# Patient Record
Sex: FEMALE | Race: WHITE | Employment: FULL TIME | URBAN - METROPOLITAN AREA
[De-identification: names, ages, dates, MRNs, and addresses within clinical notes are randomized per-mention and may not be internally consistent; named-entity substitution may affect disease eponyms.]

---

## 2020-07-04 ENCOUNTER — HOSPITAL ENCOUNTER (EMERGENCY)
Facility: HOSPITAL | Age: 30
Discharge: HOME | End: 2020-07-04
Attending: EMERGENCY MEDICINE

## 2020-07-04 VITALS
HEART RATE: 90 BPM | OXYGEN SATURATION: 98 % | WEIGHT: 170 LBS | DIASTOLIC BLOOD PRESSURE: 91 MMHG | RESPIRATION RATE: 17 BRPM | BODY MASS INDEX: 25.76 KG/M2 | SYSTOLIC BLOOD PRESSURE: 141 MMHG | TEMPERATURE: 98 F | HEIGHT: 68 IN

## 2020-07-04 DIAGNOSIS — S81.011A LACERATION OF RIGHT KNEE, INITIAL ENCOUNTER: Primary | ICD-10-CM

## 2020-07-04 PROCEDURE — 12002 RPR S/N/AX/GEN/TRNK2.6-7.5CM: CPT

## 2020-07-04 PROCEDURE — 99283 EMERGENCY DEPT VISIT LOW MDM: CPT

## 2020-07-04 PROCEDURE — 90471 IMMUNIZATION ADMIN: CPT

## 2020-07-04 NOTE — ED INITIAL ASSESSMENT (HPI)
Pt presetnts to ED with laceration on right knee. Pt states she snuck into her apartments pool and when she was getting out , she slipped striking her knee. Pt states she did not know how bad it was until she got home.

## 2020-07-04 NOTE — ED PROVIDER NOTES
Patient Seen in: [facility] Emergency Department      History   Patient presents with:  Laceration Abrasion    Stated Complaint:     HPI    25-year-old female coming for evaluation for right knee laceration.   Patient states that she tripped while ge Pulmonary:      Effort: Pulmonary effort is normal. No respiratory distress. Abdominal:      General: There is no distension. Palpations: Abdomen is soft. Tenderness: There is no tenderness. Musculoskeletal: Normal range of motion.

## 2023-07-21 ENCOUNTER — OFFICE VISIT (OUTPATIENT)
Facility: LOCATION | Age: 33
End: 2023-07-21
Payer: COMMERCIAL

## 2023-07-21 VITALS — HEART RATE: 81 BPM | TEMPERATURE: 98 F

## 2023-07-21 DIAGNOSIS — K64.4 ANAL SKIN TAG: Primary | ICD-10-CM

## 2023-07-21 PROCEDURE — 46600 DIAGNOSTIC ANOSCOPY SPX: CPT | Performed by: SURGERY

## 2023-07-21 PROCEDURE — 99204 OFFICE O/P NEW MOD 45 MIN: CPT | Performed by: SURGERY

## 2024-12-18 ENCOUNTER — INITIAL PRENATAL (OUTPATIENT)
Facility: CLINIC | Age: 34
End: 2024-12-18
Payer: COMMERCIAL

## 2024-12-18 ENCOUNTER — ULTRASOUND ENCOUNTER (OUTPATIENT)
Facility: CLINIC | Age: 34
End: 2024-12-18
Payer: COMMERCIAL

## 2024-12-18 VITALS
HEIGHT: 68 IN | SYSTOLIC BLOOD PRESSURE: 100 MMHG | WEIGHT: 182 LBS | HEART RATE: 54 BPM | DIASTOLIC BLOOD PRESSURE: 82 MMHG | BODY MASS INDEX: 27.58 KG/M2

## 2024-12-18 DIAGNOSIS — O36.80X0 PREGNANCY WITH INCONCLUSIVE FETAL VIABILITY, SINGLE OR UNSPECIFIED FETUS (HCC): ICD-10-CM

## 2024-12-18 DIAGNOSIS — Z12.4 CERVICAL CANCER SCREENING: ICD-10-CM

## 2024-12-18 DIAGNOSIS — Z3A.08 8 WEEKS GESTATION OF PREGNANCY (HCC): ICD-10-CM

## 2024-12-18 DIAGNOSIS — Z34.01 ENCOUNTER FOR SUPERVISION OF NORMAL FIRST PREGNANCY IN FIRST TRIMESTER (HCC): Primary | ICD-10-CM

## 2024-12-18 PROBLEM — O09.529 ANTEPARTUM MULTIGRAVIDA OF ADVANCED MATERNAL AGE (HCC): Status: ACTIVE | Noted: 2024-12-18

## 2024-12-18 LAB
APPEARANCE: CLEAR
BILIRUBIN: NEGATIVE
GLUCOSE (URINE DIPSTICK): NEGATIVE MG/DL
KETONES (URINE DIPSTICK): NEGATIVE MG/DL
LEUKOCYTES: NEGATIVE
MULTISTIX LOT#: NORMAL NUMERIC
NITRITE, URINE: NEGATIVE
OCCULT BLOOD: NEGATIVE
PH, URINE: 7 (ref 4.5–8)
PROTEIN (URINE DIPSTICK): NEGATIVE MG/DL
SPECIFIC GRAVITY: 1.02 (ref 1–1.03)
URINE-COLOR: YELLOW
UROBILINOGEN,SEMI-QN: 0.2 MG/DL (ref 0–1.9)

## 2024-12-18 PROCEDURE — 87491 CHLMYD TRACH DNA AMP PROBE: CPT | Performed by: OBSTETRICS & GYNECOLOGY

## 2024-12-18 PROCEDURE — 87591 N.GONORRHOEAE DNA AMP PROB: CPT | Performed by: OBSTETRICS & GYNECOLOGY

## 2024-12-18 PROCEDURE — 87086 URINE CULTURE/COLONY COUNT: CPT | Performed by: OBSTETRICS & GYNECOLOGY

## 2024-12-18 PROCEDURE — 81002 URINALYSIS NONAUTO W/O SCOPE: CPT | Performed by: OBSTETRICS & GYNECOLOGY

## 2024-12-18 PROCEDURE — 3074F SYST BP LT 130 MM HG: CPT | Performed by: OBSTETRICS & GYNECOLOGY

## 2024-12-18 PROCEDURE — 76801 OB US < 14 WKS SINGLE FETUS: CPT | Performed by: OBSTETRICS & GYNECOLOGY

## 2024-12-18 PROCEDURE — 87624 HPV HI-RISK TYP POOLED RSLT: CPT | Performed by: OBSTETRICS & GYNECOLOGY

## 2024-12-18 PROCEDURE — 3008F BODY MASS INDEX DOCD: CPT | Performed by: OBSTETRICS & GYNECOLOGY

## 2024-12-18 PROCEDURE — 3079F DIAST BP 80-89 MM HG: CPT | Performed by: OBSTETRICS & GYNECOLOGY

## 2024-12-19 LAB
C TRACH DNA SPEC QL NAA+PROBE: NEGATIVE
HPV E6+E7 MRNA CVX QL NAA+PROBE: NEGATIVE
N GONORRHOEA DNA SPEC QL NAA+PROBE: NEGATIVE

## 2024-12-29 LAB
.: NORMAL
.: NORMAL

## 2025-01-05 NOTE — PROGRESS NOTES
New OB    Patient denies h/o HSV, blood transfusion, hepatitis. Taking PNV    EDC by LMP c/w 9w1d US    Pap smear today    NIPS and carrier screen discussed - will consider next visit     AMA  - 32 weeks growth and NST 36 weeks

## 2025-01-15 ENCOUNTER — ROUTINE PRENATAL (OUTPATIENT)
Facility: CLINIC | Age: 35
End: 2025-01-15
Payer: COMMERCIAL

## 2025-01-15 ENCOUNTER — TELEPHONE (OUTPATIENT)
Facility: CLINIC | Age: 35
End: 2025-01-15

## 2025-01-15 ENCOUNTER — LAB ENCOUNTER (OUTPATIENT)
Dept: LAB | Age: 35
End: 2025-01-15
Attending: OBSTETRICS & GYNECOLOGY
Payer: COMMERCIAL

## 2025-01-15 VITALS
SYSTOLIC BLOOD PRESSURE: 100 MMHG | HEART RATE: 60 BPM | WEIGHT: 187.63 LBS | HEIGHT: 68 IN | DIASTOLIC BLOOD PRESSURE: 52 MMHG | BODY MASS INDEX: 28.44 KG/M2

## 2025-01-15 DIAGNOSIS — Z3A.12 12 WEEKS GESTATION OF PREGNANCY (HCC): ICD-10-CM

## 2025-01-15 DIAGNOSIS — O09.521 AMA (ADVANCED MATERNAL AGE) MULTIGRAVIDA 35+, FIRST TRIMESTER (HCC): Primary | ICD-10-CM

## 2025-01-15 DIAGNOSIS — Z34.01 ENCOUNTER FOR SUPERVISION OF NORMAL FIRST PREGNANCY IN FIRST TRIMESTER (HCC): ICD-10-CM

## 2025-01-15 DIAGNOSIS — Z13.79 GENETIC SCREENING: ICD-10-CM

## 2025-01-15 LAB
ANTIBODY SCREEN: NEGATIVE
BASOPHILS # BLD AUTO: 0.04 X10(3) UL (ref 0–0.2)
BASOPHILS NFR BLD AUTO: 0.6 %
DEPRECATED HBV CORE AB SER IA-ACNC: 18 NG/ML
EOSINOPHIL # BLD AUTO: 0.16 X10(3) UL (ref 0–0.7)
EOSINOPHIL NFR BLD AUTO: 2.2 %
ERYTHROCYTE [DISTWIDTH] IN BLOOD BY AUTOMATED COUNT: 12.8 %
HBV SURFACE AG SER-ACNC: <0.1 [IU]/L
HBV SURFACE AG SERPL QL IA: NONREACTIVE
HCT VFR BLD AUTO: 37.9 %
HCV AB SERPL QL IA: NONREACTIVE
HGB BLD-MCNC: 12.8 G/DL
IMM GRANULOCYTES # BLD AUTO: 0.04 X10(3) UL (ref 0–1)
IMM GRANULOCYTES NFR BLD: 0.6 %
LYMPHOCYTES # BLD AUTO: 1.6 X10(3) UL (ref 1–4)
LYMPHOCYTES NFR BLD AUTO: 22.3 %
MCH RBC QN AUTO: 29.7 PG (ref 26–34)
MCHC RBC AUTO-ENTMCNC: 33.8 G/DL (ref 31–37)
MCV RBC AUTO: 87.9 FL
MONOCYTES # BLD AUTO: 0.44 X10(3) UL (ref 0.1–1)
MONOCYTES NFR BLD AUTO: 6.1 %
NEUTROPHILS # BLD AUTO: 4.89 X10 (3) UL (ref 1.5–7.7)
NEUTROPHILS # BLD AUTO: 4.89 X10(3) UL (ref 1.5–7.7)
NEUTROPHILS NFR BLD AUTO: 68.2 %
PLATELET # BLD AUTO: 314 10(3)UL (ref 150–450)
RBC # BLD AUTO: 4.31 X10(6)UL
RH BLOOD TYPE: NEGATIVE
RUBV IGG SER QL: POSITIVE
RUBV IGG SER-ACNC: 50 IU/ML (ref 10–?)
T PALLIDUM AB SER QL IA: NONREACTIVE
WBC # BLD AUTO: 7.2 X10(3) UL (ref 4–11)

## 2025-01-15 PROCEDURE — 3078F DIAST BP <80 MM HG: CPT

## 2025-01-15 PROCEDURE — 86803 HEPATITIS C AB TEST: CPT | Performed by: OBSTETRICS & GYNECOLOGY

## 2025-01-15 PROCEDURE — 82728 ASSAY OF FERRITIN: CPT | Performed by: OBSTETRICS & GYNECOLOGY

## 2025-01-15 PROCEDURE — 86901 BLOOD TYPING SEROLOGIC RH(D): CPT | Performed by: OBSTETRICS & GYNECOLOGY

## 2025-01-15 PROCEDURE — 87389 HIV-1 AG W/HIV-1&-2 AB AG IA: CPT | Performed by: OBSTETRICS & GYNECOLOGY

## 2025-01-15 PROCEDURE — 86780 TREPONEMA PALLIDUM: CPT | Performed by: OBSTETRICS & GYNECOLOGY

## 2025-01-15 PROCEDURE — 86850 RBC ANTIBODY SCREEN: CPT | Performed by: OBSTETRICS & GYNECOLOGY

## 2025-01-15 PROCEDURE — 87340 HEPATITIS B SURFACE AG IA: CPT | Performed by: OBSTETRICS & GYNECOLOGY

## 2025-01-15 PROCEDURE — 3008F BODY MASS INDEX DOCD: CPT

## 2025-01-15 PROCEDURE — 86900 BLOOD TYPING SEROLOGIC ABO: CPT | Performed by: OBSTETRICS & GYNECOLOGY

## 2025-01-15 PROCEDURE — 85025 COMPLETE CBC W/AUTO DIFF WBC: CPT | Performed by: OBSTETRICS & GYNECOLOGY

## 2025-01-15 PROCEDURE — 3074F SYST BP LT 130 MM HG: CPT

## 2025-01-15 PROCEDURE — 86762 RUBELLA ANTIBODY: CPT | Performed by: OBSTETRICS & GYNECOLOGY

## 2025-01-15 NOTE — PROGRESS NOTES
Samm 12w1d    Having trouble staying asleep at night, will wake up at 3 am -Magnesium, unisom, and melatonin discussed  Reminded her to complete PNL - will get it done today.      EDC by LMP c/w 9w1d US     NIPS and carrier screen drawn today      AMA  - 32 weeks growth and NST 36 weeks

## 2025-01-15 NOTE — TELEPHONE ENCOUNTER
Patients name &  verified with patient.   Attempted x 3. Taken to lab to be drawn with   Lab tubes labeled   NIPT and carrier screen drawn by lab.  Tubed labeled. Test given to PSR for processing and send out.   Jacquelyn information given and patient instructed to call in 10-30 days to discuss results. Patient verbalized understanding.

## 2025-02-11 PROBLEM — E66.3 OVERWEIGHT (BMI 25.0-29.9): Status: ACTIVE | Noted: 2025-02-11

## 2025-02-11 PROBLEM — O09.512 AMA (ADVANCED MATERNAL AGE) PRIMIGRAVIDA 35+, SECOND TRIMESTER (HCC): Status: ACTIVE | Noted: 2025-02-11

## 2025-02-11 PROBLEM — G47.00 INSOMNIA: Status: ACTIVE | Noted: 2025-02-11

## 2025-02-11 PROBLEM — O26.892: Status: ACTIVE | Noted: 2025-02-11

## 2025-02-11 PROBLEM — Z67.91: Status: ACTIVE | Noted: 2025-02-11

## 2025-02-11 PROBLEM — E61.1 IRON DEFICIENCY: Status: ACTIVE | Noted: 2025-01-15

## 2025-02-12 ENCOUNTER — ROUTINE PRENATAL (OUTPATIENT)
Facility: CLINIC | Age: 35
End: 2025-02-12
Payer: COMMERCIAL

## 2025-02-12 VITALS
SYSTOLIC BLOOD PRESSURE: 114 MMHG | HEART RATE: 61 BPM | WEIGHT: 193 LBS | DIASTOLIC BLOOD PRESSURE: 54 MMHG | BODY MASS INDEX: 29.25 KG/M2 | HEIGHT: 68 IN

## 2025-02-12 DIAGNOSIS — E61.1 IRON DEFICIENCY: ICD-10-CM

## 2025-02-12 DIAGNOSIS — Z13.1 SCREENING FOR DIABETES MELLITUS: ICD-10-CM

## 2025-02-12 DIAGNOSIS — Z36.89 ENCOUNTER FOR FETAL ANATOMIC SURVEY (HCC): ICD-10-CM

## 2025-02-12 DIAGNOSIS — E66.3 OVERWEIGHT (BMI 25.0-29.9): ICD-10-CM

## 2025-02-12 DIAGNOSIS — Z67.91: ICD-10-CM

## 2025-02-12 DIAGNOSIS — O26.892: ICD-10-CM

## 2025-02-12 DIAGNOSIS — Z91.89 AT HIGH RISK FOR PRE-ECLAMPSIA: ICD-10-CM

## 2025-02-12 DIAGNOSIS — Z13.29 SCREENING FOR THYROID DISORDER: ICD-10-CM

## 2025-02-12 DIAGNOSIS — O09.512 AMA (ADVANCED MATERNAL AGE) PRIMIGRAVIDA 35+, SECOND TRIMESTER (HCC): Primary | ICD-10-CM

## 2025-02-12 DIAGNOSIS — G47.00 INSOMNIA, UNSPECIFIED TYPE: ICD-10-CM

## 2025-02-12 DIAGNOSIS — Z36.1 ANTENATAL SCREENING FOR RAISED ALPHAFETOPROTEIN LEVEL (HCC): ICD-10-CM

## 2025-02-12 LAB
CREAT UR-SCNC: 67.2 MG/DL
PROT UR-MCNC: 9.7 MG/DL (ref ?–14)

## 2025-02-12 PROCEDURE — 3074F SYST BP LT 130 MM HG: CPT | Performed by: OBSTETRICS & GYNECOLOGY

## 2025-02-12 PROCEDURE — 82570 ASSAY OF URINE CREATININE: CPT | Performed by: OBSTETRICS & GYNECOLOGY

## 2025-02-12 PROCEDURE — 84156 ASSAY OF PROTEIN URINE: CPT | Performed by: OBSTETRICS & GYNECOLOGY

## 2025-02-12 PROCEDURE — 3008F BODY MASS INDEX DOCD: CPT | Performed by: OBSTETRICS & GYNECOLOGY

## 2025-02-12 PROCEDURE — 3078F DIAST BP <80 MM HG: CPT | Performed by: OBSTETRICS & GYNECOLOGY

## 2025-02-12 NOTE — PATIENT INSTRUCTIONS
Call [facility] to schedule 20 wk fetal anatomy ultrasound (\"Level 2\")    Aspirin advised   -81 mg tablet - take 1, 1.5, or 2 tablets per day.   -may help prevent  pre-eclampsia by helping with placental development and function  -may discontinue at term (37 wk)     Limit weight gain to 15-25 lb for entire pregnancy       Rh negative blood type  Pregnant people with \"Rh negative\" blood types need to receive Rhogam injection at 28 wk prophylactically & earlier during pregnancy if trauma (e.g. fall, car accident, blow to abdomen) or unexplained vaginal bleeding occurs. If there is transfer of fetal cells across the placenta to the mother and the fetus has \"Rh positive\" blood type, this can lead to the maternal immune system making antibodies to attack the fetus, which could lead to severe fetal anemia and fetal death in potentially all future pregnancies.     AFP Level   There is an optional blood test that we can perform on you called \"AFP level.\" It is a chemical in the bloodstream produced by the pregnancy. It is done between 15-20 weeks gestation. The ideal time for testing is actually 16-18 weeks gestation. If the level is abnormally elevated, this can indicate the presence of abnormalities of the development of the brain and spinal cord such as anencephaly (lack of brain development) and spina bifida (exposed spinal cord). These anomalies can generally be seen on ultrasound. It can also be elevated in cases of normal fetal anatomy and can indicate an abnormal placenta. This may or may not be able to be detected on ultrasound. Please think about whether or not you would like to have this test done. When you decide when you would like to have this test done, please let us know. We must enter your exact gestational age and weight on the date of the blood draw for the test to be calculated accurately.     ICD-10 code  screening for raised alphafetoprotein level (HCC) [Z36.1] - diagnosis code  (CPT)  code for maternal serum alpha-fetoprotein (MSAFP) is 67882       Maternal Fetal Medicine (MFM)  Charan Barnhart Taylor, [place]   [address], [address]   Ph [phone]    [place]   Antoine JOSEPHINE [address]  [phone]    [place]h Gill  [address], [address]  Ph [phone]    [place]  [address] , [address]  Ph [phone]         [facility] Prenatal Classes (and virtual tour of Labor & Delivery unit)  [email or web address]  [phone]    Pre-registration for the hospital  [email or web address]    Breast pump  -contact your insurance to request them to send an order to us for their preferred medical supply company  Or  -contact [employer] (flyer available on the UserApp wall across from reception desk)   [email or web address]    Car seat *MUST* be installed before infant(s) can be leave the hospital    Doctor for baby   *Please select a pediatrician or family medicine provider BEFORE you are admitted to the hospital to give birth.   Pediatrics (birth-18 years of age) or Family Medicine (birth through adulthood)  Make sure that provider accepts your insurance.   Pick a provider that is close to where you live.  If the provider is on staff at [facility], the nursing staff will notify them when your infant is born so they are aware to come to the hospital to examine the infant.   If the provider you have chosen is not on staff at [facility], a pediatric hospitalist will care for your infant during the hospitalization only. You must arrange the follow up visit with your provider.   After delivery at the hospital follow up visit instructions for baby will be provided prior to discharge.     The baby will not be able to leave the hospital without a follow up visit scheduled.     To establish care:  [email or web address]  Or   [facility] Physician  Referral line at [phone]    There are MANY providers available. It would be impossible to list them all, but here are a few popular pediatrics groups in [place]:    [facility] [place] [phone]  [facility] [place] [phone]  [facility] [place] [phone]  [facility] [place] [phone]  [facility] [place] [phone]  [facility] [phone]    There are also many wonderful independent practitioners as well. We recommend you speak with family, friends, colleagues as personal recommendations can be very helpful.

## 2025-02-12 NOTE — PROGRESS NOTES
ANTONINA 16w1d - visit #3    Chief Complaint   Patient presents with    Prenatal Care     ANTONINA 16w 1d  - No complaints    Chaperone declines     No vaginal bleeding. No cramping    Vitals:    25 1047   BP: 114/54   Pulse: 61   Weight: 193 lb (87.5 kg)   Height: 68\"      TWG 13 lb (5.897 kg)     34 year old  at 16w1d   ADELITA 23 by LMP c/w 9w1d US  A negative      -NIPS low risk, BOY   -carrier screen neg  -msAFP discussed - will do it.   -classes, pre-registration, pediatrician, breast pump, car seat discussed      AMA  -discussed increased risk for GDM & GHTN/pre-eclampsia   -baseline A1c, TSH, CMP, uric acid, urine P/C  -aspirin discussed (AMA & primigravida) - advised to start now   -L2 US with MFM advised & ordered   -growth with BPP at 32 wk   -NST weekly at 36 wk     Rh negative  -Rhogam at 28 wk discussed     Overweight (pre preg BMI 27)  -wt gain goal 15-25 lb discussed      Iron deficiency  -1/15 Hb 12.8, ferritin 18  -advised extra PO iron - not taking yet     Insomnia   -12 wk - trouble staying asleep at night, will wake up at 3 am   -Magnesium, unisom, and melatonin discussed  -improved     RTC 4 wk

## 2025-02-20 ENCOUNTER — TELEPHONE (OUTPATIENT)
Facility: CLINIC | Age: 35
End: 2025-02-20

## 2025-03-05 ENCOUNTER — LAB ENCOUNTER (OUTPATIENT)
Dept: LAB | Age: 35
End: 2025-03-05
Attending: OBSTETRICS & GYNECOLOGY
Payer: COMMERCIAL

## 2025-03-05 DIAGNOSIS — Z13.1 SCREENING FOR DIABETES MELLITUS: ICD-10-CM

## 2025-03-05 DIAGNOSIS — Z13.29 SCREENING FOR THYROID DISORDER: ICD-10-CM

## 2025-03-05 DIAGNOSIS — O09.512 AMA (ADVANCED MATERNAL AGE) PRIMIGRAVIDA 35+, SECOND TRIMESTER (HCC): ICD-10-CM

## 2025-03-05 DIAGNOSIS — Z91.89 AT HIGH RISK FOR PRE-ECLAMPSIA: ICD-10-CM

## 2025-03-05 DIAGNOSIS — Z36.1 ANTENATAL SCREENING FOR RAISED ALPHAFETOPROTEIN LEVEL (HCC): ICD-10-CM

## 2025-03-05 PROBLEM — R79.89 ELEVATED TSH: Status: ACTIVE | Noted: 2025-03-05

## 2025-03-05 LAB
ALBUMIN SERPL-MCNC: 4.1 G/DL (ref 3.2–4.8)
ALBUMIN/GLOB SERPL: 1.5 {RATIO} (ref 1–2)
ALP LIVER SERPL-CCNC: 44 U/L
ALT SERPL-CCNC: 11 U/L
ANION GAP SERPL CALC-SCNC: 10 MMOL/L (ref 0–18)
AST SERPL-CCNC: 15 U/L (ref ?–34)
BILIRUB SERPL-MCNC: 0.5 MG/DL (ref 0.3–1.2)
BUN BLD-MCNC: 11 MG/DL (ref 9–23)
CALCIUM BLD-MCNC: 9 MG/DL (ref 8.7–10.6)
CHLORIDE SERPL-SCNC: 101 MMOL/L (ref 98–112)
CO2 SERPL-SCNC: 27 MMOL/L (ref 21–32)
CREAT BLD-MCNC: 0.66 MG/DL
EGFRCR SERPLBLD CKD-EPI 2021: 117 ML/MIN/1.73M2 (ref 60–?)
EST. AVERAGE GLUCOSE BLD GHB EST-MCNC: 103 MG/DL (ref 68–126)
FASTING STATUS PATIENT QL REPORTED: YES
GLOBULIN PLAS-MCNC: 2.7 G/DL (ref 2–3.5)
GLUCOSE BLD-MCNC: 89 MG/DL (ref 70–99)
HBA1C MFR BLD: 5.2 % (ref ?–5.7)
OSMOLALITY SERPL CALC.SUM OF ELEC: 285 MOSM/KG (ref 275–295)
POTASSIUM SERPL-SCNC: 4.4 MMOL/L (ref 3.5–5.1)
PROT SERPL-MCNC: 6.8 G/DL (ref 5.7–8.2)
SODIUM SERPL-SCNC: 138 MMOL/L (ref 136–145)
TSI SER-ACNC: 3.9 UIU/ML (ref 0.55–4.78)
URATE SERPL-MCNC: 3 MG/DL

## 2025-03-05 PROCEDURE — 83036 HEMOGLOBIN GLYCOSYLATED A1C: CPT | Performed by: OBSTETRICS & GYNECOLOGY

## 2025-03-05 PROCEDURE — 82105 ALPHA-FETOPROTEIN SERUM: CPT | Performed by: OBSTETRICS & GYNECOLOGY

## 2025-03-05 PROCEDURE — 84550 ASSAY OF BLOOD/URIC ACID: CPT | Performed by: OBSTETRICS & GYNECOLOGY

## 2025-03-05 PROCEDURE — 80053 COMPREHEN METABOLIC PANEL: CPT | Performed by: OBSTETRICS & GYNECOLOGY

## 2025-03-05 PROCEDURE — 84443 ASSAY THYROID STIM HORMONE: CPT | Performed by: OBSTETRICS & GYNECOLOGY

## 2025-03-06 DIAGNOSIS — R79.89 ELEVATED TSH: Primary | ICD-10-CM

## 2025-03-06 DIAGNOSIS — Z13.29 SCREENING FOR THYROID DISORDER: Primary | ICD-10-CM

## 2025-03-06 NOTE — PROGRESS NOTES
Pt aware of results & recommendations:   Baseline CMP, A1c, Uric acid normal at 19w1d    TSH 3.895 mildly elevated at 19w1d    -while still technically \"in normal range\" for the lab, the ideal TSH is <3.0 in 2nd & 3rd trimesters  -controversial whether or not to treat \"subclinical hypothyroidism\"  -some societies are pro treatment & some are not.  -the TSH can fluctuate with illness, stress, etc  -have a few choices - can recheck it in 2-4 wk & then decide if wants to take medication, or could start some low dose levothyroxine now and recheck TSH in 4 weeks.   Preference?     Pt will repeat TSH in 2-4 weeks. Order pended.    Verbalized understanding.

## 2025-03-07 LAB
AFP MOM: 0.94
AFP VALUE: 40.3 NG/ML
GA ON COLL DATE: 19.1 WEEKS
INSULIN DEP AFP: NO
MAT AGE AT EDD: 37.2 YR
MULTIPLE GEST AFP: NO
OSBR RISK 1 IN AFP: NORMAL
WEIGHT AFP: 193 LBS

## 2025-03-12 ENCOUNTER — ROUTINE PRENATAL (OUTPATIENT)
Facility: CLINIC | Age: 35
End: 2025-03-12
Payer: COMMERCIAL

## 2025-03-12 VITALS
DIASTOLIC BLOOD PRESSURE: 66 MMHG | HEART RATE: 76 BPM | BODY MASS INDEX: 30 KG/M2 | WEIGHT: 196.19 LBS | SYSTOLIC BLOOD PRESSURE: 114 MMHG

## 2025-03-12 DIAGNOSIS — Z3A.20 20 WEEKS GESTATION OF PREGNANCY (HCC): ICD-10-CM

## 2025-03-12 DIAGNOSIS — O09.522 AMA (ADVANCED MATERNAL AGE) MULTIGRAVIDA 35+, SECOND TRIMESTER (HCC): Primary | ICD-10-CM

## 2025-03-12 PROCEDURE — 3074F SYST BP LT 130 MM HG: CPT

## 2025-03-12 PROCEDURE — 3078F DIAST BP <80 MM HG: CPT

## 2025-03-12 NOTE — PROGRESS NOTES
Samm 20w1d       ADELITA 7/29/25 by LMP c/w 9w1d US  A negative      -NIPS low risk, BOY   -carrier screen neg  -msAFP discussed - will do it.   -classes, pre-registration, pediatrician, breast pump, car seat discussed      AMA  -discussed increased risk for GDM & GHTN/pre-eclampsia   -baseline A1c, TSH, CMP, uric acid, urine P/C  -aspirin discussed (AMA & primigravida) - has not started yet, advise to start today   -L2 US with MFM advised & ordered - gave info to schedule an appointment   -growth with BPP at 32 wk   -NST weekly at 36 wk      Rh negative  -Rhogam at 28 wk discussed      Overweight (pre preg BMI 27)  -wt gain goal 15-25 lb discussed       Iron deficiency  -1/15 Hb 12.8, ferritin 18  -advised extra PO iron - not taking yet      Insomnia   -12 wk - trouble staying asleep at night, will wake up at 3 am   -Magnesium, unisom, and melatonin discussed  -improved   20 wk- sleep is improving, she will wake up and fall back asleep      
<-- Click to add NO significant Past Surgical History

## 2025-03-17 ENCOUNTER — OFFICE VISIT (OUTPATIENT)
Dept: PERINATAL CARE | Facility: HOSPITAL | Age: 35
End: 2025-03-17
Attending: OBSTETRICS & GYNECOLOGY
Payer: COMMERCIAL

## 2025-03-17 VITALS
DIASTOLIC BLOOD PRESSURE: 71 MMHG | BODY MASS INDEX: 30.01 KG/M2 | SYSTOLIC BLOOD PRESSURE: 112 MMHG | HEIGHT: 68 IN | WEIGHT: 198 LBS | HEART RATE: 56 BPM

## 2025-03-17 DIAGNOSIS — O09.512 AMA (ADVANCED MATERNAL AGE) PRIMIGRAVIDA 35+, SECOND TRIMESTER (HCC): ICD-10-CM

## 2025-03-17 DIAGNOSIS — O09.512 AMA (ADVANCED MATERNAL AGE) PRIMIGRAVIDA 35+, SECOND TRIMESTER (HCC): Primary | ICD-10-CM

## 2025-03-17 PROCEDURE — 76811 OB US DETAILED SNGL FETUS: CPT | Performed by: OBSTETRICS & GYNECOLOGY

## 2025-03-17 RX ORDER — ASPIRIN 81 MG/1
81 TABLET ORAL DAILY
COMMUNITY

## 2025-03-17 NOTE — PROGRESS NOTES
Reason for Consult:   Dear Dr. Moon ,    Thank you for requesting ultrasound evaluation and maternal fetal medicine consultation on Sara Priest.  As you are aware she is a 34 year old female with a Oscar pregnancy .  A maternal-fetal medicine consultation was requested secondary to  AMA.  Her prenatal records and labs were reviewed.    Review of History:     OB History:    OB History    Para Term  AB Living   1 0 0 0 0 0   SAB IAB Ectopic Multiple Live Births   0 0 0 0 0      # Outcome Date GA Lbr Shmuel/2nd Weight Sex Type Anes PTL Lv   1 Current                      Allergies:  Allergies[1]   Current Meds:  Current Outpatient Medications   Medication Sig Dispense Refill    aspirin 81 MG Oral Tab EC Take 1 tablet (81 mg total) by mouth daily.      Prenatal MV-Min-Fe Fum-FA-DHA (PRENATAL 1 OR) Take by mouth.          HISTORY:  Past Medical History:    Acne    ASCUS with positive high risk HPV cervical    4/26/10 ASCUS, +HPV - [facility] per [facility]. Repeat pap in 1 year normal.    Bleeding internal hemorrhoids    saw GI    Hirsutism    mild on abdomen    History of tobacco use    1/2 ppd x 7 years from around [age] years old. Cold turkey.    Insomnia    During pregnancy - early morning awakening at 3 am noted at 12 wk gestation    Iron deficiency    Noted on initial prenatal labs      Overweight (BMI 25.0-29.9)    Screening for genetic disease carrier status    NEGATIVE FOR 14 OUT OF 14 DISEASES      No past surgical history on file.   Family History   Problem Relation Age of Onset    Prostate Cancer Father 50        caught early    Breast Cancer Maternal Grandmother 72    Diabetes Neg     Colon Cancer Neg     Ovarian Cancer Neg     Uterine Cancer Neg     Birth Defects Neg     Genetic Disease Neg       Social History     Socioeconomic History    Marital status: Significant Other   Tobacco Use    Smoking status: Former     Types: Cigarettes    Smokeless tobacco: Never    Vaping Use    Vaping status: Every Day    Start date: 1/12/2019   Substance and Sexual Activity    Alcohol use: Not Currently     Alcohol/week: 3.0 standard drinks of alcohol     Types: 3 Glasses of wine per week    Drug use: Not Currently     Frequency: 3.0 times per week     Types: Cannabis   Other Topics Concern    Caffeine Concern No    Exercise Yes    Seat Belt Yes    Special Diet No    Stress Concern No    Weight Concern No     Social Drivers of Health     Food Insecurity: No Food Insecurity (12/18/2024)    Food Insecurity     Food Insecurity: Never true   Transportation Needs: No Transportation Needs (12/18/2024)    Transportation Needs     Lack of Transportation: No   Stress: No Stress Concern Present (12/18/2024)    Stress     Feeling of Stress : No   Housing Stability: Low Risk  (12/18/2024)    Housing Stability     Housing Instability: No        NARRATIVE:     /71 (BP Location: Right arm, Patient Position: Sitting, Cuff Size: adult)   Pulse 56   Ht 5' 8\" (1.727 m)   Wt 198 lb (89.8 kg)   LMP 10/22/2024 (Exact Date)   BMI 30.11 kg/m²           Alert and Oriented.  No acute distress          Abdomen:  soft, nontender, no contractions noted.           extremities:  nontender, no edema    DISCUSSION  During her visit we discussed and reviewed the following issues:  ADVANCED MATERNAL AGE    Background  I reviewed with the patient that pregnancies in women of advanced maternal age (35 or older at delivery) are associated with elevated risks. Specifically, there is a higher rate of:  Fetal malformations  Preeclampsia  Gestational diabetes  Intrauterine fetal death    As a result, enhanced pregnancy surveillance is advised for these patients including a comprehensive ultrasound to assess for fetal malformations (at 20 weeks) and a third trimester ultrasound assessment for fetal growth (at 32 weeks). In addition, weekly NST's (initiating at 36 weeks gestation for women 35-39 years and at 32 weeks  gestation for women 40 years and older) are also advised. Routine obstetric care is more than adequate to assess for gestational diabetes and preeclampsia; hence, no further significant alterations in obstetric care are advised.    Medical Complications    Women 35 years of age or older can expect to experience two to three fold higher rates of hospitalization,  delivery, and pregnancy-related complications when compared to their younger counterparts.  The two most common medical problems complicating these  pregnanccies are hypertension and diabetes.   The incidence of preeclampsia in the general obstetric population is 3 to 4 percent; this increases to 5 to 10 percent in women over age 40 and is as high as 35 percent in women over age 50.   The incidence of gestational diabetes in the general obstetric population is 3 percent, rising to 7 to 12 percent in women over age 40 and 20 percent in women over age 50.  Women 35 years of age or older are more likely to be delivered by . The  delivery rate in the general obstetric population of the [place] is almost 30 percent, compared to almost 50 percent in women over age 40 to 45 and almost 80 percent in women age 50 to 63.          Fetal Death        A decision analysis tool using data from the [place] [employer]  Database predicted a strategy of weekly antepartum testing and labor induction would lower the risk of unexplained fetal death in women 35 years of age or older. In this model, weekly testing starting at 36 weeks of gestation would drop the risk of fetal death from 5.2 to 1.3 per 1000 pregnancies. While a policy of antepartum testing in older women does increase the chance that a women will be induced (71 inductions per fetal death averted) and thereby increases her risk of having a  delivery, only 14 additional cesareans would need to be performed to avert one unexplained fetal death.  Hence, weekly NST's are  advised for women of advanced maternal age; testing should be initiated at 36 weeks for women 35-39 years and at 32 weeks for women 40 years and older.    Fetal Malformations    Cardiac malformations, clubfoot, and diaphragmatic hernia appear to occur with increased frequency in offspring of older women. These abnormalities are structural and unrelated to aneuploidy, thus they would not be detected by karyotype analysis.  For these reasons a complete, detailed ultrasound (level II) is advised even if the fetus has a normal karyotype.      Fetal Aneuploidy      Invasive Testing  I offered invasive genetic testing (amniocentesis, chorionic villus sampling) after reviewing the diagnostic accuracy of these tests as well as the procedure associated loss rate (1:500 for genetic amniocentesis).        We discussed  the increased risk of chromosomal abnormalities associated with advanced maternal age at age 35. She understands that ultrasound exam cannot exclude potential genetic abnormalities.  Her estimated risk based on maternal age at amniocentesis with any chromosome abnormality is about 1:80  and with Down Syndrome is about 1: 150.   We also discussed the risks and benefits of having  genetic testing ([facility] and amniocentesis) performed.      PANORAMA TEST LOW RISK              OB ULTRASOUND REPORT   See imaging tab for complete ultrasound report or in PACS    Single IUP in breech presentation.    Placenta is anterior.   A 3 vessel cord is noted.  Cardiac activity is present at 136 bpm   g ( 0 lb 15 oz);   MVP is 4.3 cm .         Fetal Anatomy:  Visualized with normal appearance: head, face, spine, neck, skin, chest, abdominal wall, gastrointestinal tract, kidneys, bladder, extremities.   Brain: Visualized and normal appearances: brain parenchyma, cerebral ventricles, choroid plexus, Cisterna Magna, midline falx, cerebellum, cerebellar lobes, posterior fossa, vermis, cavum septi pellucidi.  Face: eyes normal,  profile normal, nose normal, lip normal, palate normal.  Heart: visualized and normal appearance: 3 vessel view, four-chamber, left outflow tract, right outflow tract, arches.      Genetic Sonogram:  Nuchal fold normal.  Pyelectasis absent.  No hyperechogenic bowel.  Echogenic intracardiac foci absent. Nasal bone present. Choroid plexus cyst absent.      Summary of Ultrasound findings:  This is a Oscar pregnancy    The fetal measurements are consistent with established EDC. No gross ultrasound evidence of structural abnormalities are seen today. No minor markers for aneuploidy are seen. The patient understands that ultrasound cannot rule out all structural and chromosomal abnormalities.       IMPRESSION:   1. IUP @  20w6d  2. Scan consistent with dates  3. No fetal structural abnormalities seen  4. AMA    RECOMMENDATIONS:   1.  Weekly NST at 36wks  2.  Growth US at 32wks    Thank you for allowing me to participate in the care of your patient.  Please do not hesitate to call with any questions or concerns.     Total time spent   40  minutes this calendar day which includes preparing to see the patient including chart review, obtaining and/or reviewing additional medical history, performing a physical exam and evaluation, documenting clinical information in the electronic medical record, independently interpreting results, counseling the patient, communicating results to the patient/family/caregiver and coordinating care.    Abel Centeno D.O.  Maternal Fetal Medicine     Note to patient and family:  The 21st Century Cures Act makes medical notes available to patients in the interest of transparency.  However, please be advised that this is a medical document.  It is intended as a peer to peer communication.  It is written in medical language and may contain abbreviations or verbiage that are technical and unfamiliar.  It may appear blunt or direct.  Medical documents are intended to carry relevant information,  facts as evident, and the clinical opinion of the practitioner.         [1] No Known Allergies

## 2025-03-17 NOTE — PROGRESS NOTES
Pt here for level II ultrasound/doctor consult  + flutters  Pt c/o irreg lower abd tightness, denies uterine cramping, vag bleeding and leaking fluid.

## 2025-04-09 ENCOUNTER — ROUTINE PRENATAL (OUTPATIENT)
Facility: CLINIC | Age: 35
End: 2025-04-09
Payer: COMMERCIAL

## 2025-04-09 VITALS
WEIGHT: 204 LBS | SYSTOLIC BLOOD PRESSURE: 100 MMHG | HEIGHT: 68 IN | BODY MASS INDEX: 30.92 KG/M2 | HEART RATE: 62 BPM | DIASTOLIC BLOOD PRESSURE: 62 MMHG

## 2025-04-09 DIAGNOSIS — Z3A.24 24 WEEKS GESTATION OF PREGNANCY (HCC): ICD-10-CM

## 2025-04-09 DIAGNOSIS — Z34.82 PRENATAL CARE, SUBSEQUENT PREGNANCY IN SECOND TRIMESTER (HCC): Primary | ICD-10-CM

## 2025-04-09 PROCEDURE — 3008F BODY MASS INDEX DOCD: CPT | Performed by: OBSTETRICS & GYNECOLOGY

## 2025-04-09 PROCEDURE — 3074F SYST BP LT 130 MM HG: CPT | Performed by: OBSTETRICS & GYNECOLOGY

## 2025-04-09 PROCEDURE — 3078F DIAST BP <80 MM HG: CPT | Performed by: OBSTETRICS & GYNECOLOGY

## 2025-04-09 NOTE — PROGRESS NOTES
24w1d    Patient has no complaints  - 1GTT and CBC ordered      ADELITA 7/29/25 by LMP c/w 9w1d US       -NIPS low risk, BOY   -carrier screen neg  -msAFP discussed - neg  -classes, pre-registration, pediatrician, breast pump, car seat discussed      AMA  -discussed increased risk for GDM & GHTN/pre-eclampsia   -baseline A1c, TSH, CMP, uric acid, urine P/C  -aspirin discussed (AMA & primigravida)   -L2 US NL  -growth with BPP at 32 wk   -NST weekly at 36 wk      Rh negative  -Rhogam at 28 wk discussed      Overweight (pre preg BMI 27)  -wt gain goal 15-25 lb discussed       Iron deficiency  -1/15 Hb 12.8, ferritin 18  -advised extra PO iron

## 2025-04-23 ENCOUNTER — LAB ENCOUNTER (OUTPATIENT)
Dept: LAB | Age: 35
End: 2025-04-23
Attending: OBSTETRICS & GYNECOLOGY
Payer: COMMERCIAL

## 2025-04-23 DIAGNOSIS — R79.89 ELEVATED TSH: ICD-10-CM

## 2025-04-23 DIAGNOSIS — Z34.82 PRENATAL CARE, SUBSEQUENT PREGNANCY IN SECOND TRIMESTER (HCC): ICD-10-CM

## 2025-04-23 LAB
BASOPHILS # BLD AUTO: 0.04 X10(3) UL (ref 0–0.2)
BASOPHILS NFR BLD AUTO: 0.4 %
EOSINOPHIL # BLD AUTO: 0.14 X10(3) UL (ref 0–0.7)
EOSINOPHIL NFR BLD AUTO: 1.5 %
ERYTHROCYTE [DISTWIDTH] IN BLOOD BY AUTOMATED COUNT: 12.5 %
GLUCOSE 1H P GLC SERPL-MCNC: 115 MG/DL (ref 70–130)
HCT VFR BLD AUTO: 34.6 % (ref 35–48)
HGB BLD-MCNC: 11.8 G/DL (ref 12–16)
IMM GRANULOCYTES # BLD AUTO: 0.05 X10(3) UL (ref 0–1)
IMM GRANULOCYTES NFR BLD: 0.5 %
LYMPHOCYTES # BLD AUTO: 1.72 X10(3) UL (ref 1–4)
LYMPHOCYTES NFR BLD AUTO: 18.3 %
MCH RBC QN AUTO: 30.1 PG (ref 26–34)
MCHC RBC AUTO-ENTMCNC: 34.1 G/DL (ref 31–37)
MCV RBC AUTO: 88.3 FL (ref 80–100)
MONOCYTES # BLD AUTO: 0.66 X10(3) UL (ref 0.1–1)
MONOCYTES NFR BLD AUTO: 7 %
NEUTROPHILS # BLD AUTO: 6.79 X10 (3) UL (ref 1.5–7.7)
NEUTROPHILS # BLD AUTO: 6.79 X10(3) UL (ref 1.5–7.7)
NEUTROPHILS NFR BLD AUTO: 72.3 %
PLATELET # BLD AUTO: 269 10(3)UL (ref 150–450)
RBC # BLD AUTO: 3.92 X10(6)UL (ref 3.8–5.3)
TSI SER-ACNC: 3.25 UIU/ML (ref 0.55–4.78)
WBC # BLD AUTO: 9.4 X10(3) UL (ref 4–11)

## 2025-04-23 PROCEDURE — 84443 ASSAY THYROID STIM HORMONE: CPT | Performed by: OBSTETRICS & GYNECOLOGY

## 2025-04-23 PROCEDURE — 85025 COMPLETE CBC W/AUTO DIFF WBC: CPT | Performed by: OBSTETRICS & GYNECOLOGY

## 2025-04-23 PROCEDURE — 82950 GLUCOSE TEST: CPT | Performed by: OBSTETRICS & GYNECOLOGY

## 2025-04-25 NOTE — PROGRESS NOTES
Pt aware of results & recommendations: TSH 3.25 at 26w1d.   -This was a repeat check due to while still technically \"in normal range\" for the lab, the ideal TSH is <3.0 in 2nd & 3rd trimesters   -controversial whether or not to treat \"subclinical hypothyroidism\"   -some societies are pro treatment & some are not.   -the TSH can fluctuate with illness, stress, etc   -could opt to start low dose levothyroxine if desires    Will hold off on medication at this time and discuss at next ANTONINA appointment. Verbalized understanding.

## 2025-05-07 ENCOUNTER — ROUTINE PRENATAL (OUTPATIENT)
Facility: CLINIC | Age: 35
End: 2025-05-07
Payer: COMMERCIAL

## 2025-05-07 ENCOUNTER — LAB ENCOUNTER (OUTPATIENT)
Dept: LAB | Age: 35
End: 2025-05-07
Payer: COMMERCIAL

## 2025-05-07 VITALS
HEART RATE: 80 BPM | BODY MASS INDEX: 31.83 KG/M2 | SYSTOLIC BLOOD PRESSURE: 110 MMHG | WEIGHT: 210 LBS | DIASTOLIC BLOOD PRESSURE: 64 MMHG | HEIGHT: 68 IN

## 2025-05-07 DIAGNOSIS — Z67.91 RH NEGATIVE STATE IN ANTEPARTUM PERIOD (HCC): ICD-10-CM

## 2025-05-07 DIAGNOSIS — O26.899 RH NEGATIVE STATE IN ANTEPARTUM PERIOD (HCC): ICD-10-CM

## 2025-05-07 DIAGNOSIS — O09.523 AMA (ADVANCED MATERNAL AGE) MULTIGRAVIDA 35+, THIRD TRIMESTER (HCC): Primary | ICD-10-CM

## 2025-05-07 DIAGNOSIS — Z11.3 ROUTINE SCREENING FOR STI (SEXUALLY TRANSMITTED INFECTION): ICD-10-CM

## 2025-05-07 LAB — T PALLIDUM AB SER QL IA: NONREACTIVE

## 2025-05-07 PROCEDURE — 3078F DIAST BP <80 MM HG: CPT

## 2025-05-07 PROCEDURE — 3074F SYST BP LT 130 MM HG: CPT

## 2025-05-07 PROCEDURE — 3008F BODY MASS INDEX DOCD: CPT

## 2025-05-07 PROCEDURE — 87389 HIV-1 AG W/HIV-1&-2 AB AG IA: CPT

## 2025-05-07 PROCEDURE — 96372 THER/PROPH/DIAG INJ SC/IM: CPT

## 2025-05-07 PROCEDURE — 86780 TREPONEMA PALLIDUM: CPT

## 2025-05-07 NOTE — PROGRESS NOTES
Samm 28w1d    She is having some mamie goldstein - reassured pt    ADELITA 7/29/25 by LMP c/w 9w1d US        -NIPS low risk, BOY   -carrier screen neg  -msAFP discussed - neg  -classes, pre-registration, pediatrician, breast pump, car seat discussed   -1 hr gtt & CBC done   -tdap discussed - considering, ask at next visit   -peds info given      AMA  -discussed increased risk for GDM & GHTN/pre-eclampsia   -baseline A1c, TSH, CMP, uric acid, urine P/C  -aspirin discussed (AMA & primigravida)   -L2 US NL  -growth with BPP at 32 wk - reminded, order placed   -NST weekly at 36 wk      Rh negative  -Rhogam given today 5/7     Overweight (pre preg BMI 27)  -wt gain goal 15-25 lb discussed       Iron deficiency  -1/15 Hb 12.8, ferritin 18  -advised extra PO iron   -4/23 Hb 11.8

## 2025-05-10 ENCOUNTER — TELEPHONE (OUTPATIENT)
Facility: CLINIC | Age: 35
End: 2025-05-10

## 2025-05-10 NOTE — TELEPHONE ENCOUNTER
Breast Pump order was received from [employer].  Order form was placed in Dr. Merary Dang's bin for signature.

## 2025-05-21 ENCOUNTER — ROUTINE PRENATAL (OUTPATIENT)
Facility: CLINIC | Age: 35
End: 2025-05-21
Payer: COMMERCIAL

## 2025-05-21 VITALS
HEART RATE: 70 BPM | BODY MASS INDEX: 31.67 KG/M2 | DIASTOLIC BLOOD PRESSURE: 62 MMHG | HEIGHT: 68 IN | WEIGHT: 209 LBS | SYSTOLIC BLOOD PRESSURE: 106 MMHG

## 2025-05-21 DIAGNOSIS — Z23 NEED FOR TDAP VACCINATION: ICD-10-CM

## 2025-05-21 DIAGNOSIS — O26.03 EXCESSIVE WEIGHT GAIN DURING PREGNANCY IN THIRD TRIMESTER (HCC): ICD-10-CM

## 2025-05-21 DIAGNOSIS — Z67.91: ICD-10-CM

## 2025-05-21 DIAGNOSIS — G47.00 INSOMNIA, UNSPECIFIED TYPE: ICD-10-CM

## 2025-05-21 DIAGNOSIS — O26.893: ICD-10-CM

## 2025-05-21 DIAGNOSIS — R79.89 ELEVATED TSH: ICD-10-CM

## 2025-05-21 DIAGNOSIS — E61.1 IRON DEFICIENCY: ICD-10-CM

## 2025-05-21 DIAGNOSIS — E66.3 OVERWEIGHT (BMI 25.0-29.9): ICD-10-CM

## 2025-05-21 DIAGNOSIS — Z13.1 SCREENING FOR DIABETES MELLITUS: ICD-10-CM

## 2025-05-21 DIAGNOSIS — O09.513 AMA (ADVANCED MATERNAL AGE) PRIMIGRAVIDA 35+, THIRD TRIMESTER (HCC): Primary | ICD-10-CM

## 2025-05-21 PROCEDURE — 3078F DIAST BP <80 MM HG: CPT | Performed by: OBSTETRICS & GYNECOLOGY

## 2025-05-21 PROCEDURE — 3074F SYST BP LT 130 MM HG: CPT | Performed by: OBSTETRICS & GYNECOLOGY

## 2025-05-21 PROCEDURE — 90471 IMMUNIZATION ADMIN: CPT | Performed by: OBSTETRICS & GYNECOLOGY

## 2025-05-21 PROCEDURE — 90715 TDAP VACCINE 7 YRS/> IM: CPT | Performed by: OBSTETRICS & GYNECOLOGY

## 2025-05-21 PROCEDURE — 3008F BODY MASS INDEX DOCD: CPT | Performed by: OBSTETRICS & GYNECOLOGY

## 2025-05-21 NOTE — PATIENT INSTRUCTIONS
Labs please.     No more weight gain.     EXCESSIVE WEIGHT GAIN  Gaining too much weight increases the risk of a large fetus. A larger fetus places itself and the mother at risk for injury during birth and increases the risk that it will not be able to descend through the pelvis, making a  section necessary. Larger fetuses can struggle with low blood sugars (hypoglycemia) as a  which is not good for the brain.     Controlling weight gain in pregnancy:  Look at current diet - write down everything you eat for a few days. Count up your mg protein, grams of fiber, etc.   Protein - aim for at least 100 grams per day. [email or web address]. Add plain (no herbal additives, etc) protein powder if needed. Whey, hemp, pea protein, etc all fine  Water - Aim for 1 gallon per day  Fiber rich foods. 25-28 grams per day.   Heartburn medication if needed   Avoid fast/simple carbohydrates (sodas) - this can spike your insulin levels & can trigger hypoglycemia which can cause ravenous hunger  Adequate sleep important for hormonal regulation of appetite.   Avoid high sodium foods. Aim for potassium-rich foods. These will have a slight diuretic effect.   Walk for at least 15 minutes after every meal.     Basic sample meal plan:    Breakfast: 15-30 g carbs for breakfast  Mid morning snack (if hungry): 15 -30 g carb   Lunch: 45-60 g carb  Mid afternoon snack (if hungry): 15-30 g carb  Dinner: 45-60 g carb   Bedtime snack if hungry can be 15 g carb with some protein.  Snack should be between 8-10 pm     Hypertension related to pregnancy/postpartum    -Watch for symptoms of pre-eclampsia (severe or persistent headache not relieved with a dose of tylenol, visual disturbances, persistent or severe upper abdominal pain, shortness of breath, chest pain)  -Please obtain a blood pressure cuff for home from the list of [place] Blood Pressure Validated Device Listing- see www.validatebp.org  -Check blood pressure (and heart rate if you can)  2-3 times per day & more frequently if feeling poorly. Keep log & bring to visits.      If BP is 140/90 or higher (either number) you will likely be prescribed oral antihypertensive medication. Check blood pressure before a dose of medication. Can hold the medicine if you feel your blood pressure is getting too low (less than 110/70) or you are lightheaded.      If BP is 160/110 (either number) or higher, or you develop symptoms of pre-eclampsia, please immediately go to the emergency department at [facility] & let them know you may have pre-eclampsia. You will likely require IV antihypertensives and IV magnesium sulfate to prevent stroke and seizures.      [facility] Prenatal Classes (and virtual tour of Labor & Delivery unit)  [email or web address]  [phone]    Pre-registration for the hospital  [email or web address]    Breast pump  -contact your insurance to request them to send an order to us for their preferred medical supply company  Or  -contact [employer] (flyer available on the lobby wall across from reception desk)   [email or web address]    Car seat *MUST* be installed before infant(s) can be leave the hospital    Doctor for baby   *Please select a pediatrician or family medicine provider BEFORE you are admitted to the hospital to give birth.   Pediatrics (birth-18 years of age) or Family Medicine (birth through adulthood)  Make sure that provider accepts your insurance.   Pick a provider that is close to where you live.  If the provider is on staff at [facility], the nursing staff will notify them when your infant is born so they are aware to come to the hospital to examine the infant.   If the provider you have chosen is not on staff at [facility], a pediatric hospitalist will care for your infant during the hospitalization only. You must arrange the follow up visit with your provider.   After delivery at the hospital follow up visit  instructions for baby will be provided prior to discharge.     The baby will not be able to leave the hospital without a follow up visit scheduled.     To establish care:  [email or web address]  Or   chris [facility] Physician Referral line at [phone]    There are MANY providers available. It would be impossible to list them all, but here are a few popular pediatrics groups in [place]:    [facility] [place] [phone]  [facility] [place] [phone]  [facility] [place] [phone]  [facility] [place] [phone]  [facility] [place] [phone]  [facility] [phone]    There are also many wonderful independent practitioners as well. We recommend you speak with family, friends, colleagues as personal recommendations can be very helpful.

## 2025-05-21 NOTE — PROGRESS NOTES
ANTONINA 30w1d     Chief Complaint   Patient presents with    Prenatal Care     ANTONINA 30w 1d  - Tdap and pregnancy vaccine info given to pt     +FM. Menstrual like cramp intermittently. No contractions, leaking fluid, vaginal bleeding. No headache, vision changes, epigastric pain. Diet is not great. Hungry often. More snacking. Tries to eat a lot of protein. There has been a little fruit.   Mild swelling in legs & feet.     Vitals:    25 1232   BP: 106/62   Pulse: 70   Weight: 209 lb (94.8 kg)   Height: 68\"      TWG 29 lb (13.2 kg)     35 year old  at 30w1d   ADELITA 25 by LMP c/w 9w1d US  A negative      -NIPS low risk, BOY   -carrier screen neg  -msAFP low risk   -classes, pre-registration, pediatrician, breast pump, car seat discussed at previous  -1 hr gtt & CBC done   -3rd trim HIV & Tpal negative   -tdap 25    AMA  -discussed increased risk for GDM & GHTN/pre-eclampsia   -baseline A1c, TSH, CMP, uric acid, urine P/C - done at 19w1d - normal   -aspirin discussed (AMA & primigravida) -  taking 81 mg daily  -L2 US normal   -growth with BPP at 32 wk - reminded, order placed - appt   -NST weekly at 36 wk      Rh negative  -Rhogam      Overweight (pre preg BMI 27) -> Excessive weight gain  -wt gain goal 15-25 lb discussed. TWG 29 lb (13.2 kg) as of 30w1d   -Cautioned regarding increased risk of fetal macrosomia, birth injury for fetus and mother, need for  section   -check A1c   -discussed BP cuff for home   -increase protein, fiber, healthy fats       Iron deficiency  -1/15 Hb 12.8, ferritin 18  -advised extra PO iron   - Hb 11.8  -repeat CBC     TSH 3.895 mildly elevated at 19w1d    -while still technically \"in normal range\" for the lab, the ideal TSH is <3.0 in 2nd & 3rd trimesters   -controversial whether or not to treat \"subclinical hypothyroidism\"   -repeat TSH in 2-4 wk - TSH 3.25 at 26w1d.   -This was a repeat check due to while still technically \"in normal range\" for the lab, the  ideal TSH is <3.0 in 2nd & 3rd trimesters   -controversial whether or not to treat \"subclinical hypothyroidism\"   -some societies are pro treatment & some are not.   -the TSH can fluctuate with illness, stress, etc   -could opt to start low dose levothyroxine if desires. Did not start medication    RTC 2 wk. NST weekly at 36 wk - message sent to  staff

## 2025-05-24 ENCOUNTER — TELEPHONE (OUTPATIENT)
Facility: CLINIC | Age: 35
End: 2025-05-24

## 2025-06-04 ENCOUNTER — ROUTINE PRENATAL (OUTPATIENT)
Facility: CLINIC | Age: 35
End: 2025-06-04
Payer: COMMERCIAL

## 2025-06-04 VITALS
HEIGHT: 68 IN | DIASTOLIC BLOOD PRESSURE: 60 MMHG | WEIGHT: 209.63 LBS | HEART RATE: 71 BPM | BODY MASS INDEX: 31.77 KG/M2 | SYSTOLIC BLOOD PRESSURE: 110 MMHG

## 2025-06-04 DIAGNOSIS — Z3A.32 32 WEEKS GESTATION OF PREGNANCY (HCC): Primary | ICD-10-CM

## 2025-06-04 PROCEDURE — 3078F DIAST BP <80 MM HG: CPT

## 2025-06-04 PROCEDURE — 3074F SYST BP LT 130 MM HG: CPT

## 2025-06-04 PROCEDURE — 3008F BODY MASS INDEX DOCD: CPT

## 2025-06-04 NOTE — PROGRESS NOTES
Samm 32w1d    She is doing well, no complaints    ADELITA 25 by LMP c/w 9w1d US  A negative      -NIPS low risk, BOY   -carrier screen neg  -msAFP low risk   -classes, pre-registration, pediatrician, breast pump, car seat discussed at previous  -1 hr gtt & CBC done   -3rd trim HIV & Tpal negative   -tdap 25     AMA  -discussed increased risk for GDM & GHTN/pre-eclampsia   -baseline A1c, TSH, CMP, uric acid, urine P/C - done at 19w1d - normal   -aspirin discussed (AMA & primigravida) -  taking 81 mg daily  -L2 US normal   -growth with BPP at 32 wk - reminded, order placed - appt   -NST weekly at 36 wk      Rh negative  -Rhogam      Overweight (pre preg BMI 27) -> Excessive weight gain  -wt gain goal 15-25 lb discussed. TWG 29 lb (13.2 kg) as of 30w1d   -Cautioned regarding increased risk of fetal macrosomia, birth injury for fetus and mother, need for  section   -check A1c   -discussed BP cuff for home   -increase protein, fiber, healthy fats       Iron deficiency  -1/15 Hb 12.8, ferritin 18  -advised extra PO iron   - Hb 11.8  -repeat CBC      TSH 3.895 mildly elevated at 19w1d    -while still technically \"in normal range\" for the lab, the ideal TSH is <3.0 in 2nd & 3rd trimesters   -controversial whether or not to treat \"subclinical hypothyroidism\"   -repeat TSH in 2-4 wk - TSH 3.25 at 26w1d.   -This was a repeat check due to while still technically \"in normal range\" for the lab, the ideal TSH is <3.0 in 2nd & 3rd trimesters   -controversial whether or not to treat \"subclinical hypothyroidism\"   -some societies are pro treatment & some are not.   -the TSH can fluctuate with illness, stress, etc   -could opt to start low dose levothyroxine if desires. Did not start medication

## 2025-06-04 NOTE — PROGRESS NOTES
Outpatient Maternal-Fetal Medicine Consultation    Dear Dr. Dang    Thank you for requesting an ultrasound and maternal-fetal medicine consultation on your patient Sara Priest.  As you are aware she is a 35 year old female  with a mandujano pregnancy and an Estimated Date of Delivery: 25.  She returned to maternal-fetal medicine today for a follow-up visit.  Her history was reviewed from her prior visit and there were no interval changes.    Antepartum Risk Factors  AMA: low-risk cell free fetal DNA, declined invasive testing    PHYSICAL EXAMINATION:  /77 (BP Location: Right arm, Patient Position: Sitting, Cuff Size: adult)   Pulse 62   Ht 5' 8\" (1.727 m)   Wt 213 lb (96.6 kg)   LMP 10/22/2024 (Exact Date)   BMI 32.39 kg/m²   General: alert and oriented, no acute distress  Abdomen: gravid, soft, non-tender  Extremities: non-tender, no edema    OBSTETRIC ULTRASOUND  The patient had a follow-up growth ultrasound today which revealed normal interval fetal growth, BPP 8/8.    Ultrasound Findings:  Single IUP in cephalic presentation.    Placenta is anterior.   A 3 vessel cord is noted. Normal cord insertion.  Cardiac activity is present at 133 bpm  EFW 2373 g ( 5 lb 4 oz); 60%.    LINDA is  29 cm.  MVP is 8.7 cm  BPP is 8/8.     The fetal measurements are consistent with established ADELITA. No gross ultrasound evidence of structural abnormalities are seen today. The patient understands that ultrasound cannot rule out all structural and chromosomal abnormalities.     See Imaging Tab For Complete Ultrasound Report  I interpreted the results and reviewed them with the patient.    DISCUSSION  During her visit we discussed and reviewed the following issues:  ADVANCED MATERNAL AGE  See prior [facility] notes for a detailed review.  She did not desire invasive genetic testing.   She has already obtained a low-risk NPIT result and was appropriately reassured.     POLYHYDRAMNIOS  Discussion  Polyhydramnios was  demonstrated on today's ultrasound.  I discussed the possible etiologies of polyhydramnios including obstruction, neurologic, karyotypic, nonkaryotypic and diabetes. Hydramnios may increase the risk of PTL/PTD and abruptio placenta if rupture membrane rupture occurs. In most  Cases (~70%) it is idiopathic and approximately 2/3 of hydramnios will resolve spontaneously.  Amniotic fluid was seen within the fetal stomach but this does not exclude the possibility of esophageal / tracheal atresia or webs.    Timing of delivery  In mild to moderate polyhydramnios with normal NST and BPP,induction of labor advised at 39 to 40 weeks of gestation as the risk of fetal death appears to increase significantly at term. In severe cases, consider induction of labor at 37 weeks to minimize the risk of umbilical cord prolapse and/or abruption upon rupture of membranes. In severe polyhydramnios unresponsive to therapy with intolerant maternal symptoms between 34 and 37 weeks,consider delivery of well-dated pregnancies without requiring amniocentesis for lung maturity.     The Society of Maternal-Fetal Medicine has opined that labor should be allowed to occur spontaneously at term for women with mild idiopathic polyhydramnios. The American College of Obstetricians and Gynecologists recommends delivery at 39+0 to 39+6 weeks, unless additional pregnancy complications warrant earlier delivery. Delivery at a tertiary center is recommended for women with severe polyhydramnios since fetal anomalies may be present.    IMPRESSION:  IUP at 33w2d  AMA: low-risk cell free fetal DNA, declined invasive testing  Polyhydramnios: Likely idiopathic    RECOMMENDATIONS:  Continue care with Dr. Dang  Follow-up growth and BPP ultrasound in 4 weeks  Weekly NST's      Thank you for allowing me to participate in the care of your patient.  Please do not hesitate to contact me if additional questions or concerns arise.      Jones Farrar M.D.    30  minutes spent in review of records, patient consultation, documentation and coordination of care.  The relevant clinical matter(s) are summarized above.     Note to patient and family  The 21st Century Cures Act makes medical notes available to patients in the interest of transparency.  However, please be advised that this is a medical document.  It is intended as [id number] communication.  It is written and medical language may contain abbreviations or verbiage that are technical and unfamiliar.  It may appear blunt or direct.  Medical documents are intended to carry relevant information, facts as evident, and the clinical opinion of the practitioner.

## 2025-06-12 ENCOUNTER — OFFICE VISIT (OUTPATIENT)
Dept: PERINATAL CARE | Facility: HOSPITAL | Age: 35
End: 2025-06-12
Attending: OBSTETRICS & GYNECOLOGY
Payer: COMMERCIAL

## 2025-06-12 VITALS
HEIGHT: 68 IN | WEIGHT: 213 LBS | HEART RATE: 62 BPM | SYSTOLIC BLOOD PRESSURE: 123 MMHG | DIASTOLIC BLOOD PRESSURE: 77 MMHG | BODY MASS INDEX: 32.28 KG/M2

## 2025-06-12 DIAGNOSIS — O09.523 AMA (ADVANCED MATERNAL AGE) MULTIGRAVIDA 35+, THIRD TRIMESTER (HCC): ICD-10-CM

## 2025-06-12 DIAGNOSIS — O09.512 AMA (ADVANCED MATERNAL AGE) PRIMIGRAVIDA 35+, SECOND TRIMESTER (HCC): ICD-10-CM

## 2025-06-12 DIAGNOSIS — O40.3XX0 POLYHYDRAMNIOS AFFECTING PREGNANCY IN THIRD TRIMESTER (HCC): Primary | ICD-10-CM

## 2025-06-12 PROCEDURE — 76816 OB US FOLLOW-UP PER FETUS: CPT | Performed by: OBSTETRICS & GYNECOLOGY

## 2025-06-12 PROCEDURE — 76819 FETAL BIOPHYS PROFIL W/O NST: CPT

## 2025-06-18 ENCOUNTER — ROUTINE PRENATAL (OUTPATIENT)
Facility: CLINIC | Age: 35
End: 2025-06-18
Payer: COMMERCIAL

## 2025-06-18 ENCOUNTER — LAB ENCOUNTER (OUTPATIENT)
Dept: LAB | Facility: HOSPITAL | Age: 35
End: 2025-06-18
Attending: OBSTETRICS & GYNECOLOGY
Payer: COMMERCIAL

## 2025-06-18 VITALS
SYSTOLIC BLOOD PRESSURE: 108 MMHG | HEIGHT: 68 IN | HEART RATE: 69 BPM | BODY MASS INDEX: 33.19 KG/M2 | DIASTOLIC BLOOD PRESSURE: 54 MMHG | WEIGHT: 219 LBS

## 2025-06-18 DIAGNOSIS — E61.1 IRON DEFICIENCY: ICD-10-CM

## 2025-06-18 DIAGNOSIS — O40.9XX0 POLYHYDRAMNIOS, ANTEPARTUM, SINGLE OR UNSPECIFIED FETUS (HCC): Primary | ICD-10-CM

## 2025-06-18 DIAGNOSIS — Z3A.34 34 WEEKS GESTATION OF PREGNANCY (HCC): ICD-10-CM

## 2025-06-18 DIAGNOSIS — O26.03 EXCESSIVE WEIGHT GAIN DURING PREGNANCY IN THIRD TRIMESTER (HCC): ICD-10-CM

## 2025-06-18 DIAGNOSIS — Z13.1 SCREENING FOR DIABETES MELLITUS: ICD-10-CM

## 2025-06-18 PROBLEM — R73.09 ELEVATED HEMOGLOBIN A1C: Status: ACTIVE | Noted: 2025-06-18

## 2025-06-18 PROBLEM — D50.9 MATERNAL IRON DEFICIENCY ANEMIA COMPLICATING PREGNANCY IN THIRD TRIMESTER (HCC): Status: ACTIVE | Noted: 2025-01-15

## 2025-06-18 PROBLEM — O99.013 MATERNAL IRON DEFICIENCY ANEMIA COMPLICATING PREGNANCY IN THIRD TRIMESTER (HCC): Status: ACTIVE | Noted: 2025-01-15

## 2025-06-18 LAB
BASOPHILS # BLD AUTO: 0.04 X10(3) UL (ref 0–0.2)
BASOPHILS NFR BLD AUTO: 0.4 %
EOSINOPHIL # BLD AUTO: 0.17 X10(3) UL (ref 0–0.7)
EOSINOPHIL NFR BLD AUTO: 1.6 %
ERYTHROCYTE [DISTWIDTH] IN BLOOD BY AUTOMATED COUNT: 12.6 %
EST. AVERAGE GLUCOSE BLD GHB EST-MCNC: 120 MG/DL (ref 68–126)
HBA1C MFR BLD: 5.8 % (ref ?–5.7)
HCT VFR BLD AUTO: 30.9 % (ref 35–48)
HGB BLD-MCNC: 10.6 G/DL (ref 12–16)
IMM GRANULOCYTES # BLD AUTO: 0.14 X10(3) UL (ref 0–1)
IMM GRANULOCYTES NFR BLD: 1.3 %
LYMPHOCYTES # BLD AUTO: 2.13 X10(3) UL (ref 1–4)
LYMPHOCYTES NFR BLD AUTO: 20.1 %
MCH RBC QN AUTO: 28.8 PG (ref 26–34)
MCHC RBC AUTO-ENTMCNC: 34.3 G/DL (ref 31–37)
MCV RBC AUTO: 84 FL (ref 80–100)
MONOCYTES # BLD AUTO: 1 X10(3) UL (ref 0.1–1)
MONOCYTES NFR BLD AUTO: 9.4 %
NEUTROPHILS # BLD AUTO: 7.11 X10 (3) UL (ref 1.5–7.7)
NEUTROPHILS # BLD AUTO: 7.11 X10(3) UL (ref 1.5–7.7)
NEUTROPHILS NFR BLD AUTO: 67.2 %
PLATELET # BLD AUTO: 247 10(3)UL (ref 150–450)
RBC # BLD AUTO: 3.68 X10(6)UL (ref 3.8–5.3)
WBC # BLD AUTO: 10.6 X10(3) UL (ref 4–11)

## 2025-06-18 PROCEDURE — 85025 COMPLETE CBC W/AUTO DIFF WBC: CPT

## 2025-06-18 PROCEDURE — 59025 FETAL NON-STRESS TEST: CPT

## 2025-06-18 PROCEDURE — 36415 COLL VENOUS BLD VENIPUNCTURE: CPT

## 2025-06-18 PROCEDURE — 3078F DIAST BP <80 MM HG: CPT

## 2025-06-18 PROCEDURE — 83036 HEMOGLOBIN GLYCOSYLATED A1C: CPT

## 2025-06-18 PROCEDURE — 3008F BODY MASS INDEX DOCD: CPT

## 2025-06-18 PROCEDURE — 3074F SYST BP LT 130 MM HG: CPT

## 2025-06-18 NOTE — PROGRESS NOTES
Samm 34w1d    She is doing well, no complaints     NST reactive     ADELITA 25 by LMP c/w 9w1d US  A negative      -NIPS low risk, BOY   -carrier screen neg  -msAFP low risk   -classes, pre-registration, pediatrician, breast pump, car seat discussed at previous  -1 hr gtt & CBC done   -3rd trim HIV & Tpal negative   -tdap 25     AMA  -discussed increased risk for GDM & GHTN/pre-eclampsia   -baseline A1c, TSH, CMP, uric acid, urine P/C - done at 19w1d - normal   -aspirin discussed (AMA & primigravida) -  taking 81 mg daily  -L2 US normal   -growth with BPP at 32 wk -efw 60%. BPP    -NST weekly at 36 wk     Polyhydramnios  -33w2d : LINDA 29  -follow up growth +BPP in 4 wk: 7/10  -weekly NSTs      Rh negative  -Rhogam      Overweight (pre preg BMI 27) -> Excessive weight gain  -wt gain goal 15-25 lb discussed. TWG 29 lb (13.2 kg) as of 30w1d   -Cautioned regarding increased risk of fetal macrosomia, birth injury for fetus and mother, need for  section   -check A1c   -discussed BP cuff for home   -increase protein, fiber, healthy fats       Iron deficiency  -1/15 Hb 12.8, ferritin 18  -advised extra PO iron   - Hb 11.8  -repeat CBC      TSH 3.895 mildly elevated at 19w1d    -while still technically \"in normal range\" for the lab, the ideal TSH is <3.0 in 2nd & 3rd trimesters   -controversial whether or not to treat \"subclinical hypothyroidism\"   -repeat TSH in 2-4 wk - TSH 3.25 at 26w1d.   -This was a repeat check due to while still technically \"in normal range\" for the lab, the ideal TSH is <3.0 in 2nd & 3rd trimesters   -controversial whether or not to treat \"subclinical hypothyroidism\"   -some societies are pro treatment & some are not.   -the TSH can fluctuate with illness, stress, etc   -could opt to start low dose levothyroxine if desires. Did not start medication         NST weekly

## 2025-06-20 ENCOUNTER — TELEPHONE (OUTPATIENT)
Facility: CLINIC | Age: 35
End: 2025-06-20

## 2025-06-20 NOTE — PROGRESS NOTES
Patient aware of Dr. Moon's recommendations:   A1c elevated and significantly higher than it was 3 months ago. Now 5.8% at 34w1d   -given polyhydramnios & hirsutism, suspect late onset gestational diabetes   -can do 1 hr GTT (this is a \"screen\") or 3 hr GTT (this is \"diagnostic\") otherwise can proceed to diabetes education.   -3 hr GTT would be best but we are running short on time & this is a fasting test that needs to be scheduled.   -call Diabetic education Ph [phone]   -cut back on sugars & simple carbs. Increase protein & fiber intake. Walk 10-15 min after every meal.   Hb 10.6 at 34w1d   -Hb down from 11.8 about 1 month ago despite PO iron   -polyhydramnios (excess amniotic fluid) is a risk factor for hemorrhage at delivery   -recommend proceed with IV iron ASAP.     Patient would like to do 1gtt first, will call pt with IV IRON approval. Routed to [place]. Patient verbalized understanding, agreed to and intend to comply with plan of care.

## 2025-06-25 ENCOUNTER — TELEPHONE (OUTPATIENT)
Age: 35
End: 2025-06-25

## 2025-06-25 ENCOUNTER — ROUTINE PRENATAL (OUTPATIENT)
Facility: CLINIC | Age: 35
End: 2025-06-25
Payer: COMMERCIAL

## 2025-06-25 VITALS
DIASTOLIC BLOOD PRESSURE: 60 MMHG | WEIGHT: 219.81 LBS | BODY MASS INDEX: 33.31 KG/M2 | HEIGHT: 68 IN | HEART RATE: 66 BPM | SYSTOLIC BLOOD PRESSURE: 110 MMHG

## 2025-06-25 DIAGNOSIS — Z3A.35 35 WEEKS GESTATION OF PREGNANCY (HCC): ICD-10-CM

## 2025-06-25 DIAGNOSIS — O40.9XX0 POLYHYDRAMNIOS, ANTEPARTUM, SINGLE OR UNSPECIFIED FETUS (HCC): Primary | ICD-10-CM

## 2025-06-25 PROCEDURE — 59025 FETAL NON-STRESS TEST: CPT

## 2025-06-25 PROCEDURE — 3008F BODY MASS INDEX DOCD: CPT

## 2025-06-25 PROCEDURE — 3074F SYST BP LT 130 MM HG: CPT

## 2025-06-25 PROCEDURE — 3078F DIAST BP <80 MM HG: CPT

## 2025-06-25 NOTE — PROGRESS NOTES
Samm 35w1d    She is doing well, no complaints     Nst : reactive     ADELITA 25 by LMP c/w 9w1d US  A negative      -NIPS low risk, BOY   -carrier screen neg  -msAFP low risk   -classes, pre-registration, pediatrician, breast pump, car seat discussed at previous  -1 hr gtt & CBC done   -3rd trim HIV & Tpal negative   -tdap 25     AMA  -discussed increased risk for GDM & GHTN/pre-eclampsia   -baseline A1c, TSH, CMP, uric acid, urine P/C - done at 19w1d - normal   -aspirin discussed (AMA & primigravida) -  taking 81 mg daily  -L2 US normal   -growth with BPP at 32 wk -efw 60%. BPP    -NST weekly at 36 wk      Polyhydramnios  -33w2d : LINDA 29  -follow up growth +BPP in 4 wk: 7/10  -weekly NSTs      Rh negative  -Rhogam      Overweight (pre preg BMI 27) -> Excessive weight gain  -wt gain goal 15-25 lb discussed. TWG 29 lb (13.2 kg) as of 30w1d   -Cautioned regarding increased risk of fetal macrosomia, birth injury for fetus and mother, need for  section   -check A1c   -discussed BP cuff for home   -increase protein, fiber, healthy fats       Iron deficiency  -1/15 Hb 12.8, ferritin 18  -advised extra PO iron   - Hb 11.8  -repeat CBC      TSH 3.895 mildly elevated at 19w1d    -while still technically \"in normal range\" for the lab, the ideal TSH is <3.0 in 2nd & 3rd trimesters   -controversial whether or not to treat \"subclinical hypothyroidism\"   -repeat TSH in 2-4 wk - TSH 3.25 at 26w1d.   -This was a repeat check due to while still technically \"in normal range\" for the lab, the ideal TSH is <3.0 in 2nd & 3rd trimesters   -controversial whether or not to treat \"subclinical hypothyroidism\"   -some societies are pro treatment & some are not.   -the TSH can fluctuate with illness, stress, etc   -could opt to start low dose levothyroxine if desires. Did not start medication          NST weekly

## 2025-07-02 ENCOUNTER — ROUTINE PRENATAL (OUTPATIENT)
Facility: CLINIC | Age: 35
End: 2025-07-02
Payer: COMMERCIAL

## 2025-07-02 VITALS
DIASTOLIC BLOOD PRESSURE: 66 MMHG | HEIGHT: 68 IN | WEIGHT: 217 LBS | BODY MASS INDEX: 32.89 KG/M2 | HEART RATE: 80 BPM | SYSTOLIC BLOOD PRESSURE: 114 MMHG

## 2025-07-02 DIAGNOSIS — O40.9XX0 POLYHYDRAMNIOS, ANTEPARTUM, SINGLE OR UNSPECIFIED FETUS (HCC): ICD-10-CM

## 2025-07-02 DIAGNOSIS — O09.519: Primary | ICD-10-CM

## 2025-07-02 DIAGNOSIS — Z3A.36 36 WEEKS GESTATION OF PREGNANCY (HCC): ICD-10-CM

## 2025-07-02 PROCEDURE — 3078F DIAST BP <80 MM HG: CPT | Performed by: STUDENT IN AN ORGANIZED HEALTH CARE EDUCATION/TRAINING PROGRAM

## 2025-07-02 PROCEDURE — 87081 CULTURE SCREEN ONLY: CPT | Performed by: STUDENT IN AN ORGANIZED HEALTH CARE EDUCATION/TRAINING PROGRAM

## 2025-07-02 PROCEDURE — 59025 FETAL NON-STRESS TEST: CPT | Performed by: STUDENT IN AN ORGANIZED HEALTH CARE EDUCATION/TRAINING PROGRAM

## 2025-07-02 PROCEDURE — 87150 DNA/RNA AMPLIFIED PROBE: CPT | Performed by: STUDENT IN AN ORGANIZED HEALTH CARE EDUCATION/TRAINING PROGRAM

## 2025-07-02 PROCEDURE — 3008F BODY MASS INDEX DOCD: CPT | Performed by: STUDENT IN AN ORGANIZED HEALTH CARE EDUCATION/TRAINING PROGRAM

## 2025-07-02 PROCEDURE — 3074F SYST BP LT 130 MM HG: CPT | Performed by: STUDENT IN AN ORGANIZED HEALTH CARE EDUCATION/TRAINING PROGRAM

## 2025-07-02 NOTE — PROGRESS NOTES
Samm 36w1d    She is doing well, no complaints. + FM  Nst : reactive   GBS done  SVE: cl/50/-4, feels vertex also by Leopold's   Is ok with likely planning induction prior to 41 wks - will wait for next US (7/10) then schedule     ADELITA 25 by LMP c/w 9w1d US  A negative      -NIPS low risk, BOY   -carrier screen neg  -msAFP low risk   -classes, pre-registration, pediatrician, breast pump, car seat discussed at previous  -1 hr gtt & CBC done   -3rd trim HIV & Tpal negative   -tdap 25  -GBS: done today      AMA  -discussed increased risk for GDM & GHTN/pre-eclampsia   -baseline A1c, TSH, CMP, uric acid, urine P/C - done at 19w1d - normal   -aspirin discussed (AMA & primigravida) -  taking 81 mg daily  -L2 US normal   -growth with BPP at 32 wk -efw 60%. BPP    -NST weekly at 36 wk      Polyhydramnios  -33w2d : LINDA 29  -follow up growth +BPP in 4 wk: 7/10  -weekly NSTs      Rh negative  -Rhogam      Overweight (pre preg BMI 27) -> Excessive weight gain  -wt gain goal 15-25 lb discussed. TWG 29 lb (13.2 kg) as of 30w1d   -Cautioned regarding increased risk of fetal macrosomia, birth injury for fetus and mother, need for  section   -check A1c: 5/8%  -discussed BP cuff for home   -increase protein, fiber, healthy fats       Iron deficiency  -1/15 Hb 12.8, ferritin 18  -advised extra PO iron   - Hb 11.8  - Hgb 10.6 - IV Iron was recommended     TSH 3.895 mildly elevated at 19w1d    -while still technically \"in normal range\" for the lab, the ideal TSH is <3.0 in 2nd & 3rd trimesters   -controversial whether or not to treat \"subclinical hypothyroidism\"   -repeat TSH in 2-4 wk - TSH 3.25 at 26w1d.   -This was a repeat check due to while still technically \"in normal range\" for the lab, the ideal TSH is <3.0 in 2nd & 3rd trimesters   -controversial whether or not to treat \"subclinical hypothyroidism\"   -some societies are pro treatment & some are not.   -the TSH can fluctuate with illness, stress,  etc   -could opt to start low dose levothyroxine if desires. Did not start medication

## 2025-07-04 LAB — GROUP B STREP BY PCR FOR PCR OVT: NEGATIVE

## 2025-07-09 ENCOUNTER — ROUTINE PRENATAL (OUTPATIENT)
Facility: CLINIC | Age: 35
End: 2025-07-09
Payer: COMMERCIAL

## 2025-07-09 VITALS
WEIGHT: 222.81 LBS | HEART RATE: 66 BPM | DIASTOLIC BLOOD PRESSURE: 76 MMHG | HEIGHT: 68 IN | SYSTOLIC BLOOD PRESSURE: 122 MMHG | BODY MASS INDEX: 33.77 KG/M2

## 2025-07-09 DIAGNOSIS — O09.519: Primary | ICD-10-CM

## 2025-07-09 DIAGNOSIS — O09.513 AMA (ADVANCED MATERNAL AGE) PRIMIGRAVIDA 35+, THIRD TRIMESTER (HCC): ICD-10-CM

## 2025-07-09 DIAGNOSIS — O40.9XX0 POLYHYDRAMNIOS, ANTEPARTUM, SINGLE OR UNSPECIFIED FETUS (HCC): ICD-10-CM

## 2025-07-09 DIAGNOSIS — Z3A.37 37 WEEKS GESTATION OF PREGNANCY (HCC): ICD-10-CM

## 2025-07-09 DIAGNOSIS — O26.03 EXCESSIVE WEIGHT GAIN DURING PREGNANCY IN THIRD TRIMESTER (HCC): ICD-10-CM

## 2025-07-09 DIAGNOSIS — E61.1 IRON DEFICIENCY: ICD-10-CM

## 2025-07-09 DIAGNOSIS — R79.89 ELEVATED TSH: ICD-10-CM

## 2025-07-09 DIAGNOSIS — Z67.91: ICD-10-CM

## 2025-07-09 DIAGNOSIS — O26.893: ICD-10-CM

## 2025-07-09 PROCEDURE — 3008F BODY MASS INDEX DOCD: CPT | Performed by: STUDENT IN AN ORGANIZED HEALTH CARE EDUCATION/TRAINING PROGRAM

## 2025-07-09 PROCEDURE — 3074F SYST BP LT 130 MM HG: CPT | Performed by: STUDENT IN AN ORGANIZED HEALTH CARE EDUCATION/TRAINING PROGRAM

## 2025-07-09 PROCEDURE — 3078F DIAST BP <80 MM HG: CPT | Performed by: STUDENT IN AN ORGANIZED HEALTH CARE EDUCATION/TRAINING PROGRAM

## 2025-07-09 PROCEDURE — 59025 FETAL NON-STRESS TEST: CPT | Performed by: STUDENT IN AN ORGANIZED HEALTH CARE EDUCATION/TRAINING PROGRAM

## 2025-07-09 NOTE — PROGRESS NOTES
Samm 37.1    She is doing well, no complaints. + FM  Nst : reactive   Is ok with likely planning induction prior to 41 wks - will wait for next US (7/10) then schedule     ADELITA 25 by LMP c/w 9w1d US  A negative      -NIPS low risk, BOY   -carrier screen neg  -msAFP low risk   -classes, pre-registration, pediatrician, breast pump, car seat discussed at previous  -1 hr gtt & CBC done   -3rd trim HIV & Tpal negative   -tdap 25  -GBS: negative     AMA  -discussed increased risk for GDM & GHTN/pre-eclampsia   -baseline A1c, TSH, CMP, uric acid, urine P/C - done at 19w1d - normal   -aspirin discussed (AMA & primigravida) -  taking 81 mg daily  -L2 US normal   -growth with BPP at 32 wk -efw 60%. BPP    -NST weekly at 36 wk      Polyhydramnios  -33w2d : LINDA 29  -follow up growth +BPP in 4 wk: 7/10  -weekly NSTs      Rh negative  -Rhogam      Overweight (pre preg BMI 27) -> Excessive weight gain  -wt gain goal 15-25 lb discussed. TWG 29 lb (13.2 kg) as of 30w1d   -Cautioned regarding increased risk of fetal macrosomia, birth injury for fetus and mother, need for  section   -check A1c: 5/8%  -discussed BP cuff for home   -increase protein, fiber, healthy fats       Iron deficiency  -1/15 Hb 12.8, ferritin 18  -advised extra PO iron   - Hb 11.8  - Hgb 10.6 - IV Iron was recommended     TSH 3.895 mildly elevated at 19w1d    -while still technically \"in normal range\" for the lab, the ideal TSH is <3.0 in 2nd & 3rd trimesters   -controversial whether or not to treat \"subclinical hypothyroidism\"   -repeat TSH in 2-4 wk - TSH 3.25 at 26w1d.   -This was a repeat check due to while still technically \"in normal range\" for the lab, the ideal TSH is <3.0 in 2nd & 3rd trimesters   -controversial whether or not to treat \"subclinical hypothyroidism\"   -some societies are pro treatment & some are not.   -the TSH can fluctuate with illness, stress, etc   -could opt to start low dose levothyroxine if desires. Did  not start medication          Helical Rim Advancement Flap Text: The defect edges were debeveled with a #15 blade scalpel.  Given the location of the defect and the proximity to free margins (helical rim) a double helical rim advancement flap was deemed most appropriate. Using a sterile surgical marker, the appropriate advancement flaps were drawn incorporating the defect and placing the expected incisions between the helical rim and antihelix where possible.  The area thus outlined was incised through and through with a #15 scalpel blade.  With a skin hook and iris scissors, the flaps were gently and sharply undermined and freed up. Folllowing this, the designed flaps were carried over into the primary defect and sutured into place.

## 2025-07-10 ENCOUNTER — ULTRASOUND ENCOUNTER (OUTPATIENT)
Dept: PERINATAL CARE | Facility: HOSPITAL | Age: 35
End: 2025-07-10
Attending: OBSTETRICS & GYNECOLOGY
Payer: COMMERCIAL

## 2025-07-10 VITALS
HEART RATE: 59 BPM | HEIGHT: 68 IN | DIASTOLIC BLOOD PRESSURE: 81 MMHG | BODY MASS INDEX: 33.49 KG/M2 | SYSTOLIC BLOOD PRESSURE: 119 MMHG | WEIGHT: 221 LBS

## 2025-07-10 DIAGNOSIS — O09.523 AMA (ADVANCED MATERNAL AGE) MULTIGRAVIDA 35+, THIRD TRIMESTER (HCC): ICD-10-CM

## 2025-07-10 DIAGNOSIS — O40.3XX0 POLYHYDRAMNIOS AFFECTING PREGNANCY IN THIRD TRIMESTER (HCC): ICD-10-CM

## 2025-07-10 DIAGNOSIS — O09.513 PRIMIGRAVIDA OF ADVANCED MATERNAL AGE IN THIRD TRIMESTER (HCC): ICD-10-CM

## 2025-07-10 DIAGNOSIS — O09.523 AMA (ADVANCED MATERNAL AGE) MULTIGRAVIDA 35+, THIRD TRIMESTER (HCC): Primary | ICD-10-CM

## 2025-07-10 PROCEDURE — 76819 FETAL BIOPHYS PROFIL W/O NST: CPT

## 2025-07-10 PROCEDURE — 76816 OB US FOLLOW-UP PER FETUS: CPT | Performed by: OBSTETRICS & GYNECOLOGY

## 2025-07-10 NOTE — PROGRESS NOTES
Outpatient Maternal-Fetal Medicine Consultation    Dear Dr. Dang    Thank you for requesting an ultrasound and maternal-fetal medicine consultation on your patient Sara Priest.  As you are aware she is a 35 year old female  with a mandujano pregnancy and an Estimated Date of Delivery: 25.  She returned to maternal-fetal medicine today for a follow-up visit.  Her history was reviewed from her prior visit and there were no interval changes.    Antepartum Risk Factors  AMA: low-risk cell free fetal DNA, declined invasive testing  Polyhydramnios    PHYSICAL EXAMINATION:  /81 (BP Location: Right arm, Patient Position: Sitting, Cuff Size: adult)   Pulse 59   Ht 5' 8\" (1.727 m)   Wt 221 lb (100.2 kg)   LMP 10/22/2024 (Exact Date)   BMI 33.60 kg/m²   General: alert and oriented, no acute distress  Abdomen: gravid, soft, non-tender  Extremities: non-tender, no edema    OBSTETRIC ULTRASOUND  The patient had a follow-up growth ultrasound today which revealed normal interval fetal growth, BPP 8/8.    Ultrasound Findings:  Single IUP in cephalic presentation.    Placenta is anterior.   Cardiac activity is present at 140 bpm  EFW 3096 g ( 6 lb 13 oz); 49%.    LINDA is  25.8 cm.  MVP is 7 cm  BPP is 8/8.   Amniotic fluid is consistent with mild polyhydramnios   The fetal measurements are consistent with established ADELITA. No gross ultrasound evidence of structural abnormalities are seen today. The patient understands that ultrasound cannot rule out all structural and chromosomal abnormalities.     See Imaging Tab For Complete Ultrasound Report  I interpreted the results and reviewed them with the patient.    DISCUSSION  During her visit we discussed and reviewed the following issues:  ADVANCED MATERNAL AGE  See prior M notes for a detailed review.  She did not desire invasive genetic testing.   She has already obtained a low-risk NPIT result and was appropriately reassured.      POLYHYDRAMNIOS  Discussion  Polyhydramnios was demonstrated on today's ultrasound.  I discussed the possible etiologies of polyhydramnios including obstruction, neurologic, karyotypic, nonkaryotypic and diabetes. Hydramnios may increase the risk of PTL/PTD and abruptio placenta if rupture membrane rupture occurs. In most  Cases (~70%) it is idiopathic and approximately 2/3 of hydramnios will resolve spontaneously.  Amniotic fluid was seen within the fetal stomach but this does not exclude the possibility of esophageal / tracheal atresia or webs.    Timing of delivery  In mild to moderate polyhydramnios with normal NST and BPP,induction of labor advised at 39 to 40 weeks of gestation as the risk of fetal death appears to increase significantly at term. In severe cases, consider induction of labor at 37 weeks to minimize the risk of umbilical cord prolapse and/or abruption upon rupture of membranes. In severe polyhydramnios unresponsive to therapy with intolerant maternal symptoms between 34 and 37 weeks,consider delivery of well-dated pregnancies without requiring amniocentesis for lung maturity.     The Society of Maternal-Fetal Medicine has opined that labor should be allowed to occur spontaneously at term for women with mild idiopathic polyhydramnios. The American College of Obstetricians and Gynecologists recommends delivery at 39+0 to 39+6 weeks, unless additional pregnancy complications warrant earlier delivery. Delivery at a tertiary center is recommended for women with severe polyhydramnios since fetal anomalies may be present.    We reviewed the signs and symptoms of preeclampsia,  labor and monitoring fetal movement.  We discussed reasons for her to call her physician.      IMPRESSION:  IUP at 37w2d  AMA: low-risk cell free fetal DNA, declined invasive testing  Polyhydramnios: Likely idiopathic slightly improved from the prior assessment    RECOMMENDATIONS:  Continue care with   [place]  Weekly NST's      Thank you for allowing me to participate in the care of your patient.  Please do not hesitate to contact me if additional questions or concerns arise.      Snehal Delacruz M.D.    30 minutes spent in review of records, patient consultation, documentation and coordination of care.  The relevant clinical matter(s) are summarized above.     Note to patient and family  The 21st Century Cures Act makes medical notes available to patients in the interest of transparency.  However, please be advised that this is a medical document.  It is intended as [id number] communication.  It is written and medical language may contain abbreviations or verbiage that are technical and unfamiliar.  It may appear blunt or direct.  Medical documents are intended to carry relevant information, facts as evident, and the clinical opinion of the practitioner.

## 2025-07-10 NOTE — PROGRESS NOTES
Pt here for Growth Ultrasound  +fm noted per patient  Pt noted sciatic nerve pain, swelling of lower extremities.  Pt denies further complaints.

## 2025-07-16 ENCOUNTER — ROUTINE PRENATAL (OUTPATIENT)
Facility: CLINIC | Age: 35
End: 2025-07-16
Payer: COMMERCIAL

## 2025-07-16 ENCOUNTER — TELEPHONE (OUTPATIENT)
Facility: CLINIC | Age: 35
End: 2025-07-16

## 2025-07-16 VITALS
WEIGHT: 221.38 LBS | HEART RATE: 71 BPM | HEIGHT: 68 IN | SYSTOLIC BLOOD PRESSURE: 116 MMHG | DIASTOLIC BLOOD PRESSURE: 64 MMHG | BODY MASS INDEX: 33.55 KG/M2

## 2025-07-16 DIAGNOSIS — Z3A.38 38 WEEKS GESTATION OF PREGNANCY (HCC): ICD-10-CM

## 2025-07-16 DIAGNOSIS — O40.3XX0 POLYHYDRAMNIOS IN THIRD TRIMESTER COMPLICATION, SINGLE OR UNSPECIFIED FETUS (HCC): ICD-10-CM

## 2025-07-16 DIAGNOSIS — Z34.03 ENCOUNTER FOR SUPERVISION OF NORMAL FIRST PREGNANCY IN THIRD TRIMESTER (HCC): Primary | ICD-10-CM

## 2025-07-16 PROCEDURE — 3078F DIAST BP <80 MM HG: CPT | Performed by: STUDENT IN AN ORGANIZED HEALTH CARE EDUCATION/TRAINING PROGRAM

## 2025-07-16 PROCEDURE — 3074F SYST BP LT 130 MM HG: CPT | Performed by: STUDENT IN AN ORGANIZED HEALTH CARE EDUCATION/TRAINING PROGRAM

## 2025-07-16 PROCEDURE — 59025 FETAL NON-STRESS TEST: CPT | Performed by: STUDENT IN AN ORGANIZED HEALTH CARE EDUCATION/TRAINING PROGRAM

## 2025-07-16 PROCEDURE — 3008F BODY MASS INDEX DOCD: CPT | Performed by: STUDENT IN AN ORGANIZED HEALTH CARE EDUCATION/TRAINING PROGRAM

## 2025-07-16 NOTE — TELEPHONE ENCOUNTER
----- Message from Hannah Anderson sent at 7/16/2025 12:12 PM CDT -----  Regarding: schedule induction  Can we schedule IOL for this patient after 39 weeks for mild polyhydramnios, AMA  PM with cytotec  ADELITA: 7/29/2025

## 2025-07-16 NOTE — PROGRESS NOTES
Samm 38.1    She is doing well, no complaints. + FM  NST reactive  SVE: cl/50/-3  Sent msg to schedulers to IOL 39+ weeks 2/2 AMA, poly    ADELITA 25 by LMP c/w 9w1d US  A negative      -NIPS low risk, BOY   -carrier screen neg  -msAFP low risk   -classes, pre-registration, pediatrician, breast pump, car seat discussed at previous  -1 hr gtt & CBC done   -3rd trim HIV & Tpal negative   -tdap 25  -GBS: negative     AMA  -discussed increased risk for GDM & GHTN/pre-eclampsia   -baseline A1c, TSH, CMP, uric acid, urine P/C - done at 19w1d - normal   -aspirin discussed (AMA & primigravida) -  taking 81 mg daily  -L2 US normal   -growth with BPP at 32 wk -efw 60%. BPP    -NST weekly at 36 wk      Polyhydramnios  -33w2d : LINDA 29  -follow up growth +BPP: EFW 49%ile, BPP , LINDA 25.8 cm (improved from last)    -weekly NSTs      Rh negative  -Rhogam      Overweight (pre preg BMI 27) -> Excessive weight gain  -wt gain goal 15-25 lb discussed. TWG 29 lb (13.2 kg) as of 30w1d   -Cautioned regarding increased risk of fetal macrosomia, birth injury for fetus and mother, need for  section   -check A1c: 5/8%  -discussed BP cuff for home   -increase protein, fiber, healthy fats       Iron deficiency  -1/15 Hb 12.8, ferritin 18  -advised extra PO iron   - Hb 11.8  - Hgb 10.6 - IV Iron was recommended     TSH 3.895 mildly elevated at 19w1d    -while still technically \"in normal range\" for the lab, the ideal TSH is <3.0 in 2nd & 3rd trimesters   -controversial whether or not to treat \"subclinical hypothyroidism\"   -repeat TSH in 2-4 wk - TSH 3.25 at 26w1d.   -This was a repeat check due to while still technically \"in normal range\" for the lab, the ideal TSH is <3.0 in 2nd & 3rd trimesters   -controversial whether or not to treat \"subclinical hypothyroidism\"   -some societies are pro treatment & some are not.   -the TSH can fluctuate with illness, stress, etc   -could opt to start low dose levothyroxine if  desires. Did not start medication

## 2025-07-23 ENCOUNTER — ANESTHESIA EVENT (OUTPATIENT)
Dept: OBGYN UNIT | Facility: HOSPITAL | Age: 35
End: 2025-07-23
Payer: COMMERCIAL

## 2025-07-23 ENCOUNTER — ANESTHESIA (OUTPATIENT)
Dept: OBGYN UNIT | Facility: HOSPITAL | Age: 35
End: 2025-07-23
Payer: COMMERCIAL

## 2025-07-23 ENCOUNTER — HOSPITAL ENCOUNTER (INPATIENT)
Facility: HOSPITAL | Age: 35
LOS: 4 days | Discharge: HOME | End: 2025-07-27
Attending: OBSTETRICS & GYNECOLOGY | Admitting: OBSTETRICS & GYNECOLOGY

## 2025-07-23 PROBLEM — Z34.90 PREGNANCY (HCC): Status: ACTIVE | Noted: 2025-07-23

## 2025-07-23 PROBLEM — O42.90 AMNIOTIC FLUID LEAKING (HCC): Status: ACTIVE | Noted: 2025-07-23

## 2025-07-23 LAB
ALBUMIN SERPL-MCNC: 3.6 G/DL (ref 3.2–4.8)
ALBUMIN/GLOB SERPL: 1.2 (ref 1–2)
ALP LIVER SERPL-CCNC: 158 U/L (ref 37–98)
ALT SERPL-CCNC: 37 U/L (ref 10–49)
ANION GAP SERPL CALC-SCNC: 10 MMOL/L (ref 0–18)
ANTIBODY SCREEN: NEGATIVE
AST SERPL-CCNC: 42 U/L (ref ?–34)
BASOPHILS # BLD AUTO: 0.04 X10(3) UL (ref 0–0.2)
BASOPHILS NFR BLD AUTO: 0.4 %
BILIRUB SERPL-MCNC: 0.5 MG/DL (ref 0.3–1.2)
BUN BLD-MCNC: 9 MG/DL (ref 9–23)
CALCIUM BLD-MCNC: 8.4 MG/DL (ref 8.7–10.6)
CHLORIDE SERPL-SCNC: 104 MMOL/L (ref 98–112)
CO2 SERPL-SCNC: 24 MMOL/L (ref 21–32)
CREAT BLD-MCNC: 0.66 MG/DL (ref 0.55–1.02)
CREAT UR-SCNC: 53.9 MG/DL
EGFRCR SERPLBLD CKD-EPI 2021: 116 ML/MIN/1.73M2 (ref 60–?)
EOSINOPHIL # BLD AUTO: 0.1 X10(3) UL (ref 0–0.7)
EOSINOPHIL NFR BLD AUTO: 1 %
ERYTHROCYTE [DISTWIDTH] IN BLOOD BY AUTOMATED COUNT: 13 %
FASTING STATUS PATIENT QL REPORTED: NO
GLOBULIN PLAS-MCNC: 2.9 G/DL (ref 2–3.5)
GLUCOSE BLD-MCNC: 91 MG/DL (ref 70–99)
HCT VFR BLD AUTO: 31.2 % (ref 35–48)
HGB BLD-MCNC: 10.6 G/DL (ref 12–16)
IMM GRANULOCYTES # BLD AUTO: 0.14 X10(3) UL (ref 0–1)
IMM GRANULOCYTES NFR BLD: 1.4 %
LYMPHOCYTES # BLD AUTO: 1.85 X10(3) UL (ref 1–4)
LYMPHOCYTES NFR BLD AUTO: 18.2 %
MCH RBC QN AUTO: 28.3 PG (ref 26–34)
MCHC RBC AUTO-ENTMCNC: 34 G/DL (ref 31–37)
MCV RBC AUTO: 83.4 FL (ref 80–100)
MONOCYTES # BLD AUTO: 0.69 X10(3) UL (ref 0.1–1)
MONOCYTES NFR BLD AUTO: 6.8 %
NEUTROPHILS # BLD AUTO: 7.34 X10 (3) UL (ref 1.5–7.7)
NEUTROPHILS # BLD AUTO: 7.34 X10(3) UL (ref 1.5–7.7)
NEUTROPHILS NFR BLD AUTO: 72.2 %
OSMOLALITY SERPL CALC.SUM OF ELEC: 284 MOSM/KG (ref 275–295)
PLATELET # BLD AUTO: 217 10(3)UL (ref 150–450)
POTASSIUM SERPL-SCNC: 4.1 MMOL/L (ref 3.5–5.1)
PROT SERPL-MCNC: 6.5 G/DL (ref 5.7–8.2)
PROT UR-MCNC: 13.5 MG/DL (ref ?–14)
PROT/CREAT UR-RTO: 0.25
RBC # BLD AUTO: 3.74 X10(6)UL (ref 3.8–5.3)
RH BLOOD TYPE: NEGATIVE
SODIUM SERPL-SCNC: 138 MMOL/L (ref 136–145)
T PALLIDUM AB SER QL IA: NONREACTIVE
URATE SERPL-MCNC: 3.9 MG/DL (ref 3.1–7.8)
WBC # BLD AUTO: 10.2 X10(3) UL (ref 4–11)

## 2025-07-23 RX ORDER — LIDOCAINE HYDROCHLORIDE AND EPINEPHRINE 15; 5 MG/ML; UG/ML
INJECTION, SOLUTION EPIDURAL AS NEEDED
Status: DISCONTINUED | OUTPATIENT
Start: 2025-07-23 | End: 2025-07-24 | Stop reason: SURG

## 2025-07-23 RX ORDER — BUPIVACAINE HYDROCHLORIDE 2.5 MG/ML
INJECTION, SOLUTION EPIDURAL; INFILTRATION; INTRACAUDAL; PERINEURAL AS NEEDED
Status: DISCONTINUED | OUTPATIENT
Start: 2025-07-23 | End: 2025-07-24 | Stop reason: SURG

## 2025-07-23 RX ORDER — NALBUPHINE HYDROCHLORIDE 10 MG/ML
2.5 INJECTION INTRAMUSCULAR; INTRAVENOUS; SUBCUTANEOUS
Status: DISCONTINUED | OUTPATIENT
Start: 2025-07-23 | End: 2025-07-24

## 2025-07-23 RX ORDER — LIDOCAINE HYDROCHLORIDE 20 MG/ML
5 INJECTION, SOLUTION EPIDURAL; INFILTRATION; INTRACAUDAL; PERINEURAL AS NEEDED
Status: DISCONTINUED | OUTPATIENT
Start: 2025-07-23 | End: 2025-07-24

## 2025-07-23 RX ORDER — CALCIUM CARBONATE 500 MG/1
1000 TABLET, CHEWABLE ORAL EVERY 4 HOURS PRN
Status: DISCONTINUED | OUTPATIENT
Start: 2025-07-23 | End: 2025-07-24

## 2025-07-23 RX ORDER — IBUPROFEN 600 MG/1
600 TABLET, FILM COATED ORAL ONCE AS NEEDED
Status: DISCONTINUED | OUTPATIENT
Start: 2025-07-23 | End: 2025-07-24

## 2025-07-23 RX ORDER — DEXTROSE, SODIUM CHLORIDE, SODIUM LACTATE, POTASSIUM CHLORIDE, AND CALCIUM CHLORIDE 5; .6; .31; .03; .02 G/100ML; G/100ML; G/100ML; G/100ML; G/100ML
INJECTION, SOLUTION INTRAVENOUS AS NEEDED
Status: DISCONTINUED | OUTPATIENT
Start: 2025-07-23 | End: 2025-07-24

## 2025-07-23 RX ORDER — LIDOCAINE HYDROCHLORIDE AND EPINEPHRINE 15; 5 MG/ML; UG/ML
5 INJECTION, SOLUTION EPIDURAL AS NEEDED
Status: DISCONTINUED | OUTPATIENT
Start: 2025-07-23 | End: 2025-07-24

## 2025-07-23 RX ORDER — ONDANSETRON 2 MG/ML
4 INJECTION INTRAMUSCULAR; INTRAVENOUS EVERY 6 HOURS PRN
Status: DISCONTINUED | OUTPATIENT
Start: 2025-07-23 | End: 2025-07-24

## 2025-07-23 RX ORDER — ROPIVACAINE HYDROCHLORIDE 5 MG/ML
30 INJECTION, SOLUTION EPIDURAL; INFILTRATION; PERINEURAL AS NEEDED
Status: DISCONTINUED | OUTPATIENT
Start: 2025-07-23 | End: 2025-07-24

## 2025-07-23 RX ORDER — TERBUTALINE SULFATE 1 MG/ML
0.25 INJECTION SUBCUTANEOUS AS NEEDED
Status: DISCONTINUED | OUTPATIENT
Start: 2025-07-23 | End: 2025-07-24

## 2025-07-23 RX ORDER — ACETAMINOPHEN 500 MG
500 TABLET ORAL EVERY 6 HOURS PRN
Status: DISCONTINUED | OUTPATIENT
Start: 2025-07-23 | End: 2025-07-24

## 2025-07-23 RX ORDER — BUPIVACAINE HCL/0.9 % NACL/PF 0.25 %
5 PLASTIC BAG, INJECTION (ML) EPIDURAL AS NEEDED
Status: DISCONTINUED | OUTPATIENT
Start: 2025-07-23 | End: 2025-07-24

## 2025-07-23 RX ORDER — ACETAMINOPHEN 500 MG
1000 TABLET ORAL EVERY 6 HOURS PRN
Status: DISCONTINUED | OUTPATIENT
Start: 2025-07-23 | End: 2025-07-24

## 2025-07-23 RX ORDER — SODIUM CHLORIDE 9 MG/ML
10 INJECTION, SOLUTION INTRAMUSCULAR; INTRAVENOUS; SUBCUTANEOUS AS NEEDED
Status: DISCONTINUED | OUTPATIENT
Start: 2025-07-23 | End: 2025-07-24

## 2025-07-23 RX ORDER — BUPIVACAINE HYDROCHLORIDE 2.5 MG/ML
30 INJECTION, SOLUTION EPIDURAL; INFILTRATION; INTRACAUDAL; PERINEURAL AS NEEDED
Status: DISCONTINUED | OUTPATIENT
Start: 2025-07-23 | End: 2025-07-24

## 2025-07-23 RX ORDER — SODIUM CHLORIDE, SODIUM LACTATE, POTASSIUM CHLORIDE, CALCIUM CHLORIDE 600; 310; 30; 20 MG/100ML; MG/100ML; MG/100ML; MG/100ML
INJECTION, SOLUTION INTRAVENOUS CONTINUOUS
Status: DISCONTINUED | OUTPATIENT
Start: 2025-07-23 | End: 2025-07-24

## 2025-07-23 RX ORDER — HYDROMORPHONE HYDROCHLORIDE 1 MG/ML
1 INJECTION, SOLUTION INTRAMUSCULAR; INTRAVENOUS; SUBCUTANEOUS EVERY 2 HOUR PRN
Refills: 0 | Status: DISCONTINUED | OUTPATIENT
Start: 2025-07-23 | End: 2025-07-24

## 2025-07-23 RX ORDER — CITRIC ACID/SODIUM CITRATE 334-500MG
30 SOLUTION, ORAL ORAL AS NEEDED
Status: DISCONTINUED | OUTPATIENT
Start: 2025-07-23 | End: 2025-07-24

## 2025-07-23 RX ADMIN — BUPIVACAINE HYDROCHLORIDE 10 ML: 2.5 INJECTION, SOLUTION EPIDURAL; INFILTRATION; INTRACAUDAL; PERINEURAL at 13:40:00

## 2025-07-23 RX ADMIN — LIDOCAINE HYDROCHLORIDE AND EPINEPHRINE 3 ML: 15; 5 INJECTION, SOLUTION EPIDURAL at 13:38:00

## 2025-07-23 NOTE — ANESTHESIA PREPROCEDURE EVALUATION
PRE-OP EVALUATION    Patient Name: Sara Priest    Admit Diagnosis: Pregnancy (HCC) [Z34.90]    Pre-op Diagnosis: * No surgery found *        Anesthesia Procedure: LABOR ANALGESIA    * Surgery not found *    Pre-op vitals reviewed.  Temp: 98 °F (36.7 °C)  Pulse: 60  Resp: 18  BP: 119/89  SpO2: 100 %  There is no height or weight on file to calculate BMI.    Current medications reviewed.  Hospital Medications:  Current Medications[1]    Outpatient Medications:   Prescriptions Prior to Admission[2]    Allergies: Patient has no known allergies.      Anesthesia Evaluation    Patient summary reviewed.    Anesthetic Complications  (-) history of anesthetic complications         GI/Hepatic/Renal                                 Cardiovascular                (+) obesity                                       Endo/Other                                  Pulmonary                           Neuro/Psych                              AMA (advanced maternal age) primigravida 35+, third trimester ([facility]) Amniotic fluid leaking (HCC)  Elevated TSH Elevated hemoglobin A1c  Excessive weight gain during pregnancy in third trimester (HCC) Insomnia  Maternal iron deficiency anemia complicating pregnancy in third trimester ([facility]) Overweight (BMI 25.0-29.9)  Polyhydramnios, antepartum complication (HCC) Pregnancy (HCC)  Rh negative, maternal, third trimester (HCC)             Past Surgical History[3]  Social Hx on file[4]  History   Drug Use Unknown     Available pre-op labs reviewed.  Lab Results   Component Value Date    WBC 10.2 07/23/2025    RBC 3.74 (L) 07/23/2025    HGB 10.6 (L) 07/23/2025    HCT 31.2 (L) 07/23/2025    MCV 83.4 07/23/2025    MCH 28.3 07/23/2025    MCHC 34.0 07/23/2025    RDW 13.0 07/23/2025    .0 07/23/2025     Lab Results   Component Value Date     07/23/2025    K 4.1 07/23/2025     07/23/2025    CO2 24.0 07/23/2025    BUN 9 07/23/2025    CREATSERUM 0.66 07/23/2025    GLU 91 07/23/2025    CA 8.4 (L)  07/23/2025            Airway      Mallampati: I  Mouth opening: >3 FB  TM distance: > 6 cm  Neck ROM: full Cardiovascular    Cardiovascular exam normal.         Dental    Dentition appears grossly intact         Pulmonary    Pulmonary exam normal.                 Other findings              ASA: 2 and emergent  Plan: epidural  NPO status verified and           Plan/risks discussed with: patient and spouse                Present on Admission:   Polyhydramnios, antepartum complication (HCC)   Elevated hemoglobin A1c   AMA (advanced maternal age) primigravida 35+, third trimester (HCC)             [1]    lactated ringers infusion   Intravenous Continuous    dextrose in lactated ringers 5% infusion   Intravenous PRN    lactated ringers IV bolus 500 mL  500 mL Intravenous PRN    acetaminophen (Tylenol Extra Strength) tab 500 mg  500 mg Oral Q6H PRN    acetaminophen (Tylenol Extra Strength) tab 1,000 mg  1,000 mg Oral Q6H PRN    ibuprofen (Motrin) tab 600 mg  600 mg Oral Once PRN    ondansetron (Zofran) 4 MG/2ML injection 4 mg  4 mg Intravenous Q6H PRN    oxyTOCIN in sodium chloride 0.9% (Pitocin) 30 Units/500mL infusion premix  62.5-900 giovana-units/min Intravenous Continuous    terbutaline (Brethine) 1 MG/ML injection 0.25 mg  0.25 mg Subcutaneous PRN    sodium citrate-citric acid (Bicitra) 500-334 MG/5ML oral solution 30 mL  30 mL Oral PRN    ropivacaine (Naropin) 0.5% injection  30 mL Injection PRN    HYDROmorphone (Dilaudid) 1 MG/ML injection 1 mg  1 mg Intravenous Q2H PRN    calcium carbonate (Tums) chewable tab 1,000 mg  1,000 mg Oral Q4H PRN    [COMPLETED] misoprostol (CYTOTEC) partial tablets 50 mcg  50 mcg Oral Q2H    oxyTOCIN in sodium chloride 0.9% (Pitocin) 30 Units/500mL infusion premix  0.5-20 giovana-units/min Intravenous Continuous    lactated ringers IV bolus 1,000 mL  1,000 mL Intravenous Once    fentaNYL-bupivacaine 2 mcg/mL-0.125% in sodium chloride 0.9% 100 mL EPIDURAL infusion premix  12 mL/hr Epidural  Continuous    fentaNYL (Sublimaze) 50 mcg/mL injection 100 mcg  100 mcg Epidural Once    lidocaine 1.5%-EPINEPHrine 1:200,000 (Xylocaine-Epinephrine) injection  5 mL Injection PRN    bupivacaine PF (Marcaine) 0.25% injection  30 mL Injection PRN    lidocaine PF (Xylocaine-MPF) 2% injection  5 mL Injection PRN    sodium chloride 0.9% PF injection 10 mL  10 mL Injection PRN    ePHEDrine (PF) 25 MG/5 ML injection 5 mg  5 mg Intravenous PRN    nalbuphine (Nubain) 10 mg/mL injection 2.5 mg  2.5 mg Intravenous Q15 Min PRN   [2]   Facility-Administered Medications Prior to Admission   Medication Dose Route Frequency Provider Last Rate Last Admin    [COMPLETED] Rho D immune globulin (RhoGAM) IM injection 300 mcg  300 mcg Intramuscular Once    300 mcg at 25 0926     Medications Prior to Admission   Medication Sig Dispense Refill Last Dose/Taking    aspirin 81 MG Oral Tab EC Take 1 tablet (81 mg total) by mouth in the morning.   Past Week    Prenatal MV-Min-Fe Fum-FA-DHA (PRENATAL 1 OR) Take by mouth.   Past Week   [3] History reviewed. No pertinent surgical history.  [4]   Social History  Socioeconomic History    Marital status: Significant Other   Tobacco Use    Smoking status: Former     Current packs/day: 0.00     Average packs/day: 0.5 packs/day for 5.0 years (2.5 ttl pk-yrs)     Types: Cigarettes     Quit date: 2017     Years since quittin.0    Smokeless tobacco: Never   Vaping Use    Vaping status: Every Day    Start date: 2019   Substance and Sexual Activity    Alcohol use: Not Currently     Alcohol/week: 3.0 standard drinks of alcohol     Types: 3 Glasses of wine per week    Drug use: Not Currently     Frequency: 3.0 times per week     Types: Cannabis   Other Topics Concern    Caffeine Concern No    Exercise Yes    Seat Belt Yes    Special Diet No    Stress Concern No    Weight Concern No

## 2025-07-23 NOTE — PROGRESS NOTES
Pt is a 35 year old female admitted to TR5/TRG5-A.     Chief Complaint   Patient presents with    R/o Rom     Pt went to bathroom and had large gush of  clear fluid around 0430, continues to leak. +FM, some cramping, denies bleeding      Pt is  39w1d intra-uterine pregnancy.  History obtained, consents signed. Oriented to room, staff, and plan of care.

## 2025-07-23 NOTE — PLAN OF CARE
Problem: BIRTH - VAGINAL/ SECTION  Goal: Fetal and maternal status remain reassuring during the birth process  Description: INTERVENTIONS:  - Monitor vital signs  - Monitor fetal heart rate  - Monitor uterine activity  - Monitor labor progression (vaginal delivery)  - DVT prophylaxis (C/S delivery)  - Surgical antibiotic prophylaxis (C/S delivery)  Outcome: Progressing     Problem: PAIN - ADULT  Goal: Verbalizes/displays adequate comfort level or patient's stated pain goal  Description: INTERVENTIONS:  - Encourage pt to monitor pain and request assistance  - Assess pain using appropriate pain scale  - Administer analgesics based on type and severity of pain and evaluate response  - Implement non-pharmacological measures as appropriate and evaluate response  - Consider cultural and social influences on pain and pain management  - Manage/alleviate anxiety  - Utilize distraction and/or relaxation techniques  - Monitor for opioid side effects  - Notify MD/LIP if interventions unsuccessful or patient reports new pain  - Anticipate increased pain with activity and pre-medicate as appropriate  Outcome: Progressing     Problem: ANXIETY  Goal: Will report anxiety at manageable levels  Description: INTERVENTIONS:  - Administer medication as ordered  - Teach and rehearse alternative coping skills  - Provide emotional support with 1:1 interaction with staff  Outcome: Progressing     Problem: Patient/Family Goals  Goal: Patient/Family Long Term Goal  Description: Patient's Long Term Goal: Uncomplicated vaginal delivery    Interventions:  VS per protocol  I&O  Ice chips and sips as tolerated  EFM per protocol  Maintain IV as ordered  Antibiotics as needed per protocol  Informed consent  - See additional Care Plan goals for specific interventions  Outcome: Progressing  Goal: Patient/Family Short Term Goal  Description: Patient's Short Term Goal: Adequate pain control with delivery of infant  Interventions:  Pain  assessment scores as ordered  Patient scores pain a \"3\" or less  Multidisciplinary care   Nonpharmacologic comfort measures    - See additional Care Plan goals for specific interventions  Outcome: Progressing

## 2025-07-23 NOTE — ANESTHESIA PROCEDURE NOTES
Labor Analgesia    Date/Time: 7/23/2025 1:27 PM    Performed by: Arias Cobian MD  Authorized by: Arias Cobian MD      General Information and Staff    Start Time:  7/23/2025 1:27 PM  End Time:  7/23/2025 1:49 PM  Anesthesiologist:  Arias Cobian MD  Performed by:  Anesthesiologist  Patient Location:  OB  Site Identification: surface landmarks    Reason for Block: labor epidural    Preanesthetic Checklist: patient identified, IV checked, risks and benefits discussed, monitors and equipment checked, pre-op evaluation, timeout performed, IV bolus, anesthesia consent and sterile technique used      Procedure Details    Patient Position:  Sitting  Prep: ChloraPrep    Monitoring:  Heart rate and continuous pulse ox  Approach:  Midline    Epidural Needle    Injection Technique:  TON air  Needle Type:  Tuohy  Needle Gauge:  17 G  Needle Length:  3.375 in  Location:  L3-4    Spinal Needle      Catheter    Catheter Type:  End hole  Catheter Size:  19 G  Test Dose:  Negative    Assessment      Additional Comments     Marcaine 0.25% 10cc given  100 mcg fentanyl given  Infusion started at 12cc/hr

## 2025-07-24 LAB
BASOPHILS # BLD AUTO: 0.03 X10(3) UL (ref 0–0.2)
BASOPHILS NFR BLD AUTO: 0.2 %
EOSINOPHIL # BLD AUTO: 0 X10(3) UL (ref 0–0.7)
EOSINOPHIL NFR BLD AUTO: 0 %
ERYTHROCYTE [DISTWIDTH] IN BLOOD BY AUTOMATED COUNT: 12.9 %
FETAL SCREEN RESULT: NEGATIVE
HCT VFR BLD AUTO: 28.8 % (ref 35–48)
HGB BLD-MCNC: 9.9 G/DL (ref 12–16)
IMM GRANULOCYTES # BLD AUTO: 0.14 X10(3) UL (ref 0–1)
IMM GRANULOCYTES NFR BLD: 0.8 %
LYMPHOCYTES # BLD AUTO: 0.91 X10(3) UL (ref 1–4)
LYMPHOCYTES NFR BLD AUTO: 4.9 %
MCH RBC QN AUTO: 27.9 PG (ref 26–34)
MCHC RBC AUTO-ENTMCNC: 34.4 G/DL (ref 31–37)
MCV RBC AUTO: 81.1 FL (ref 80–100)
MONOCYTES # BLD AUTO: 0.86 X10(3) UL (ref 0.1–1)
MONOCYTES NFR BLD AUTO: 4.7 %
NEUTROPHILS # BLD AUTO: 16.54 X10 (3) UL (ref 1.5–7.7)
NEUTROPHILS # BLD AUTO: 16.54 X10(3) UL (ref 1.5–7.7)
NEUTROPHILS NFR BLD AUTO: 89.4 %
PLATELET # BLD AUTO: 209 10(3)UL (ref 150–450)
RBC # BLD AUTO: 3.55 X10(6)UL (ref 3.8–5.3)
WBC # BLD AUTO: 18.5 X10(3) UL (ref 4–11)

## 2025-07-24 PROCEDURE — 59510 CESAREAN DELIVERY: CPT | Performed by: OBSTETRICS & GYNECOLOGY

## 2025-07-24 RX ORDER — HYDROMORPHONE HYDROCHLORIDE 1 MG/ML
0.4 INJECTION, SOLUTION INTRAMUSCULAR; INTRAVENOUS; SUBCUTANEOUS EVERY 5 MIN PRN
Status: DISCONTINUED | OUTPATIENT
Start: 2025-07-24 | End: 2025-07-24 | Stop reason: HOSPADM

## 2025-07-24 RX ORDER — IBUPROFEN 600 MG/1
600 TABLET, FILM COATED ORAL EVERY 6 HOURS
Status: DISCONTINUED | OUTPATIENT
Start: 2025-07-25 | End: 2025-07-27

## 2025-07-24 RX ORDER — PHENYLEPHRINE HCL 10 MG/ML
VIAL (ML) INJECTION AS NEEDED
Status: DISCONTINUED | OUTPATIENT
Start: 2025-07-24 | End: 2025-07-24 | Stop reason: SURG

## 2025-07-24 RX ORDER — NALOXONE HYDROCHLORIDE 0.4 MG/ML
0.08 INJECTION, SOLUTION INTRAMUSCULAR; INTRAVENOUS; SUBCUTANEOUS
Status: ACTIVE | OUTPATIENT
Start: 2025-07-24 | End: 2025-07-25

## 2025-07-24 RX ORDER — DOCUSATE SODIUM 100 MG/1
100 CAPSULE, LIQUID FILLED ORAL
Status: DISCONTINUED | OUTPATIENT
Start: 2025-07-24 | End: 2025-07-27

## 2025-07-24 RX ORDER — ACETAMINOPHEN 500 MG
1000 TABLET ORAL ONCE
Status: COMPLETED | OUTPATIENT
Start: 2025-07-24 | End: 2025-07-24

## 2025-07-24 RX ORDER — KETOROLAC TROMETHAMINE 30 MG/ML
INJECTION, SOLUTION INTRAMUSCULAR; INTRAVENOUS
Status: COMPLETED
Start: 2025-07-24 | End: 2025-07-24

## 2025-07-24 RX ORDER — MORPHINE SULFATE 2 MG/ML
INJECTION, SOLUTION INTRAMUSCULAR; INTRAVENOUS AS NEEDED
Status: DISCONTINUED | OUTPATIENT
Start: 2025-07-24 | End: 2025-07-24 | Stop reason: SURG

## 2025-07-24 RX ORDER — DIPHENHYDRAMINE HCL 25 MG
25 CAPSULE ORAL EVERY 4 HOURS PRN
Status: DISCONTINUED | OUTPATIENT
Start: 2025-07-24 | End: 2025-07-27

## 2025-07-24 RX ORDER — SODIUM CHLORIDE, SODIUM LACTATE, POTASSIUM CHLORIDE, CALCIUM CHLORIDE 600; 310; 30; 20 MG/100ML; MG/100ML; MG/100ML; MG/100ML
INJECTION, SOLUTION INTRAVENOUS CONTINUOUS PRN
Status: DISCONTINUED | OUTPATIENT
Start: 2025-07-24 | End: 2025-07-24 | Stop reason: SURG

## 2025-07-24 RX ORDER — HYDROMORPHONE HYDROCHLORIDE 1 MG/ML
0.2 INJECTION, SOLUTION INTRAMUSCULAR; INTRAVENOUS; SUBCUTANEOUS EVERY 5 MIN PRN
Status: DISCONTINUED | OUTPATIENT
Start: 2025-07-24 | End: 2025-07-24 | Stop reason: HOSPADM

## 2025-07-24 RX ORDER — GABAPENTIN 300 MG/1
300 CAPSULE ORAL EVERY 8 HOURS PRN
Status: DISCONTINUED | OUTPATIENT
Start: 2025-07-24 | End: 2025-07-25

## 2025-07-24 RX ORDER — NALBUPHINE HYDROCHLORIDE 10 MG/ML
2.5 INJECTION INTRAMUSCULAR; INTRAVENOUS; SUBCUTANEOUS
Status: DISCONTINUED | OUTPATIENT
Start: 2025-07-24 | End: 2025-07-24 | Stop reason: HOSPADM

## 2025-07-24 RX ORDER — SIMETHICONE 80 MG
80 TABLET,CHEWABLE ORAL 3 TIMES DAILY PRN
Status: DISCONTINUED | OUTPATIENT
Start: 2025-07-24 | End: 2025-07-27

## 2025-07-24 RX ORDER — HYDROMORPHONE HYDROCHLORIDE 2 MG/1
2 TABLET ORAL EVERY 4 HOURS PRN
Status: DISCONTINUED | OUTPATIENT
Start: 2025-07-24 | End: 2025-07-27

## 2025-07-24 RX ORDER — NIFEDIPINE 30 MG/1
30 TABLET, EXTENDED RELEASE ORAL DAILY
Status: DISCONTINUED | OUTPATIENT
Start: 2025-07-24 | End: 2025-07-27

## 2025-07-24 RX ORDER — NALBUPHINE HYDROCHLORIDE 10 MG/ML
2.5 INJECTION INTRAMUSCULAR; INTRAVENOUS; SUBCUTANEOUS EVERY 4 HOURS PRN
Status: DISCONTINUED | OUTPATIENT
Start: 2025-07-24 | End: 2025-07-27

## 2025-07-24 RX ORDER — SODIUM CHLORIDE, SODIUM LACTATE, POTASSIUM CHLORIDE, CALCIUM CHLORIDE 600; 310; 30; 20 MG/100ML; MG/100ML; MG/100ML; MG/100ML
INJECTION, SOLUTION INTRAVENOUS CONTINUOUS
Status: DISCONTINUED | OUTPATIENT
Start: 2025-07-24 | End: 2025-07-27

## 2025-07-24 RX ORDER — ACETAMINOPHEN 500 MG
1000 TABLET ORAL EVERY 6 HOURS
Status: DISCONTINUED | OUTPATIENT
Start: 2025-07-24 | End: 2025-07-27

## 2025-07-24 RX ORDER — ONDANSETRON 2 MG/ML
4 INJECTION INTRAMUSCULAR; INTRAVENOUS ONCE AS NEEDED
Status: DISCONTINUED | OUTPATIENT
Start: 2025-07-24 | End: 2025-07-24 | Stop reason: HOSPADM

## 2025-07-24 RX ORDER — DEXTROSE, SODIUM CHLORIDE, SODIUM LACTATE, POTASSIUM CHLORIDE, AND CALCIUM CHLORIDE 5; .6; .31; .03; .02 G/100ML; G/100ML; G/100ML; G/100ML; G/100ML
INJECTION, SOLUTION INTRAVENOUS CONTINUOUS PRN
Status: DISCONTINUED | OUTPATIENT
Start: 2025-07-24 | End: 2025-07-27

## 2025-07-24 RX ORDER — BISACODYL 10 MG
10 SUPPOSITORY, RECTAL RECTAL ONCE AS NEEDED
Status: DISCONTINUED | OUTPATIENT
Start: 2025-07-24 | End: 2025-07-27

## 2025-07-24 RX ORDER — DIPHENHYDRAMINE HYDROCHLORIDE 50 MG/ML
12.5 INJECTION, SOLUTION INTRAMUSCULAR; INTRAVENOUS EVERY 4 HOURS PRN
Status: DISCONTINUED | OUTPATIENT
Start: 2025-07-24 | End: 2025-07-27

## 2025-07-24 RX ORDER — AMMONIA 15 % (W/V)
0.3 AMPUL (EA) INHALATION AS NEEDED
Status: DISCONTINUED | OUTPATIENT
Start: 2025-07-24 | End: 2025-07-27

## 2025-07-24 RX ORDER — HYDROMORPHONE HYDROCHLORIDE 1 MG/ML
0.4 INJECTION, SOLUTION INTRAMUSCULAR; INTRAVENOUS; SUBCUTANEOUS EVERY 2 HOUR PRN
Status: ACTIVE | OUTPATIENT
Start: 2025-07-24 | End: 2025-07-25

## 2025-07-24 RX ORDER — LIDOCAINE HYDROCHLORIDE AND EPINEPHRINE 20; 5 MG/ML; UG/ML
INJECTION, SOLUTION EPIDURAL; INFILTRATION; INTRACAUDAL; PERINEURAL AS NEEDED
Status: DISCONTINUED | OUTPATIENT
Start: 2025-07-24 | End: 2025-07-24 | Stop reason: SURG

## 2025-07-24 RX ORDER — ONDANSETRON 2 MG/ML
4 INJECTION INTRAMUSCULAR; INTRAVENOUS EVERY 6 HOURS PRN
Status: DISCONTINUED | OUTPATIENT
Start: 2025-07-24 | End: 2025-07-27

## 2025-07-24 RX ORDER — HYDROMORPHONE HYDROCHLORIDE 1 MG/ML
0.2 INJECTION, SOLUTION INTRAMUSCULAR; INTRAVENOUS; SUBCUTANEOUS EVERY 2 HOUR PRN
Status: DISCONTINUED | OUTPATIENT
Start: 2025-07-24 | End: 2025-07-27

## 2025-07-24 RX ORDER — KETOROLAC TROMETHAMINE 30 MG/ML
30 INJECTION, SOLUTION INTRAMUSCULAR; INTRAVENOUS EVERY 6 HOURS
Status: DISPENSED | OUTPATIENT
Start: 2025-07-24 | End: 2025-07-25

## 2025-07-24 RX ORDER — KETOROLAC TROMETHAMINE 30 MG/ML
30 INJECTION, SOLUTION INTRAMUSCULAR; INTRAVENOUS ONCE
Status: COMPLETED | OUTPATIENT
Start: 2025-07-24 | End: 2025-07-24

## 2025-07-24 RX ORDER — CHOLECALCIFEROL (VITAMIN D3) 25 MCG
1 TABLET,CHEWABLE ORAL DAILY
Status: DISCONTINUED | OUTPATIENT
Start: 2025-07-24 | End: 2025-07-27

## 2025-07-24 RX ORDER — DEXAMETHASONE SODIUM PHOSPHATE 4 MG/ML
VIAL (ML) INJECTION AS NEEDED
Status: DISCONTINUED | OUTPATIENT
Start: 2025-07-24 | End: 2025-07-24 | Stop reason: SURG

## 2025-07-24 RX ADMIN — LIDOCAINE HYDROCHLORIDE AND EPINEPHRINE 3 ML: 20; 5 INJECTION, SOLUTION EPIDURAL; INFILTRATION; INTRACAUDAL; PERINEURAL at 02:03:00

## 2025-07-24 RX ADMIN — PHENYLEPHRINE HCL 40 MCG: 10 MG/ML VIAL (ML) INJECTION at 01:40:00

## 2025-07-24 RX ADMIN — SODIUM CHLORIDE, SODIUM LACTATE, POTASSIUM CHLORIDE, CALCIUM CHLORIDE: 600; 310; 30; 20 INJECTION, SOLUTION INTRAVENOUS at 01:31:00

## 2025-07-24 RX ADMIN — DEXAMETHASONE SODIUM PHOSPHATE 4 MG: 4 MG/ML VIAL (ML) INJECTION at 01:46:00

## 2025-07-24 RX ADMIN — LIDOCAINE HYDROCHLORIDE AND EPINEPHRINE 15 ML: 20; 5 INJECTION, SOLUTION EPIDURAL; INFILTRATION; INTRACAUDAL; PERINEURAL at 01:29:00

## 2025-07-24 RX ADMIN — MORPHINE SULFATE 2 MG: 2 INJECTION, SOLUTION INTRAMUSCULAR; INTRAVENOUS at 02:03:00

## 2025-07-24 RX ADMIN — PHENYLEPHRINE HCL 40 MCG: 10 MG/ML VIAL (ML) INJECTION at 01:35:00

## 2025-07-24 RX ADMIN — ONDANSETRON 4 MG: 2 INJECTION INTRAMUSCULAR; INTRAVENOUS at 01:47:00

## 2025-07-24 NOTE — PLAN OF CARE
Problem: POSTPARTUM  Goal: Long Term Goal:Experiences normal postpartum course  Description: INTERVENTIONS:  - Assess and monitor vital signs and lab values.  - Assess fundus and lochia.  - Provide ice/sitz baths for perineum discomfort.  - Monitor healing of incision/episiotomy/laceration, and assess for signs and symptoms of infection and hematoma.  - Assess bladder function and monitor for bladder distention.  - Provide/instruct/assist with pericare as needed.  - Provide VTE prophylaxis as needed.  - Monitor bowel function.  - Encourage ambulation and provide assistance as needed.  - Assess and monitor emotional status and provide social service/psych resources as needed.  - Utilize standard precautions and use personal protective equipment as indicated. Ensure aseptic care of all intravenous lines and invasive tubes/drains.  - Obtain immunization and exposure to communicable diseases history.  Outcome: Progressing  Goal: Optimize infant feeding at the breast  Description: INTERVENTIONS:  - Initiate breast feeding within first hour after birth.   - Monitor effectiveness of current breast feeding efforts.  - Assess support systems available to mother/family.  - Identify cultural beliefs/practices regarding lactation, letdown techniques, maternal food preferences.  - Assess mother's knowledge and previous experience with breast feeding.  - Provide information as needed about early infant feeding cues (e.g., rooting, lip smacking, sucking fingers/hand) versus late cue of crying.  - Discuss/demonstrate breast feeding aids (e.g., infant sling, nursing footstool/pillows, and breast pumps).  - Encourage mother/other family members to express feelings/concerns, and actively listen.  - Educate father/SO about benefits of breast feeding and how to manage common lactation challenges.  - Recommend avoidance of specific medications or substances incompatible with breast feeding.  - Assess and monitor for signs of nipple  pain/trauma.  - Instruct and provide assistance with proper latch.  - Review techniques for milk expression (breast pumping) and storage of breast milk. Provide pumping equipment/supplies, instructions and assistance, as needed.  - Encourage rooming-in and breast feeding on demand.  - Encourage skin-to-skin contact.  - Provide LC support as needed.  - Assess for and manage engorgement.  - Provide breast feeding education handouts and information on community breast feeding support.   Outcome: Progressing  Goal: Establishment of adequate milk supply with medication/procedure interruptions  Description: INTERVENTIONS:  - Review techniques for milk expression (breast pumping).   - Provide pumping equipment/supplies, instructions, and assistance until it is safe to breastfeed infant.  Outcome: Progressing  Goal: Appropriate maternal -  bonding  Description: INTERVENTIONS:  - Assess caregiver- interactions.  - Assess caregiver's emotional status and coping mechanisms.  - Encourage caregiver to participate in  daily care.  - Assess support systems available to mother/family.  - Provide /case management support as needed.  Outcome: Progressing     Problem: Diabetes/Glucose Control  Goal: Glucose maintained within prescribed range  Description: INTERVENTIONS:  - Monitor Blood Glucose as ordered  - Assess for signs and symptoms of hyperglycemia and hypoglycemia  - Administer ordered medications to maintain glucose within target range  - Assess barriers to adequate nutritional intake and initiate nutrition consult as needed  - Instruct patient on self management of diabetes  Outcome: Progressing

## 2025-07-24 NOTE — PLAN OF CARE
ADMISSION NOTE  Received patient from L&D. Oriented to room. Safety precautions initiated. Bed in lowest position and call light within reach. Report received from SALIMA Tim. POC discussed with patient. All questions answered at this time.   Problem: POSTPARTUM  Goal: Long Term Goal:Experiences normal postpartum course  Description: INTERVENTIONS:  - Assess and monitor vital signs and lab values.  - Assess fundus and lochia.  - Provide ice/sitz baths for perineum discomfort.  - Monitor healing of incision/episiotomy/laceration, and assess for signs and symptoms of infection and hematoma.  - Assess bladder function and monitor for bladder distention.  - Provide/instruct/assist with pericare as needed.  - Provide VTE prophylaxis as needed.  - Monitor bowel function.  - Encourage ambulation and provide assistance as needed.  - Assess and monitor emotional status and provide social service/psych resources as needed.  - Utilize standard precautions and use personal protective equipment as indicated. Ensure aseptic care of all intravenous lines and invasive tubes/drains.  - Obtain immunization and exposure to communicable diseases history.  Outcome: Progressing  Goal: Optimize infant feeding at the breast  Description: INTERVENTIONS:  - Initiate breast feeding within first hour after birth.   - Monitor effectiveness of current breast feeding efforts.  - Assess support systems available to mother/family.  - Identify cultural beliefs/practices regarding lactation, letdown techniques, maternal food preferences.  - Assess mother's knowledge and previous experience with breast feeding.  - Provide information as needed about early infant feeding cues (e.g., rooting, lip smacking, sucking fingers/hand) versus late cue of crying.  - Discuss/demonstrate breast feeding aids (e.g., infant sling, nursing footstool/pillows, and breast pumps).  - Encourage mother/other family members to express feelings/concerns, and actively  listen.  - Educate father/SO about benefits of breast feeding and how to manage common lactation challenges.  - Recommend avoidance of specific medications or substances incompatible with breast feeding.  - Assess and monitor for signs of nipple pain/trauma.  - Instruct and provide assistance with proper latch.  - Review techniques for milk expression (breast pumping) and storage of breast milk. Provide pumping equipment/supplies, instructions and assistance, as needed.  - Encourage rooming-in and breast feeding on demand.  - Encourage skin-to-skin contact.  - Provide LC support as needed.  - Assess for and manage engorgement.  - Provide breast feeding education handouts and information on community breast feeding support.   Outcome: Progressing  Goal: Establishment of adequate milk supply with medication/procedure interruptions  Description: INTERVENTIONS:  - Review techniques for milk expression (breast pumping).   - Provide pumping equipment/supplies, instructions, and assistance until it is safe to breastfeed infant.  Outcome: Progressing  Goal: Experiences normal breast weaning course  Description: INTERVENTIONS:  - Assess for and manage engorgement.  - Instruct on breast care.  - Provide comfort measures.  Outcome: Progressing  Goal: Appropriate maternal -  bonding  Description: INTERVENTIONS:  - Assess caregiver- interactions.  - Assess caregiver's emotional status and coping mechanisms.  - Encourage caregiver to participate in  daily care.  - Assess support systems available to mother/family.  - Provide /case management support as needed.  Outcome: Progressing

## 2025-07-24 NOTE — PROGRESS NOTES
Patient has been pushing for 3 hours with good effort. Fetal descend arrested at +1 station with pushing, 0 station between contractions. Discussed with patient, will proceed with C/S.  Questions answered.

## 2025-07-24 NOTE — ANESTHESIA POSTPROCEDURE EVALUATION
[facility]    Sara Priest Patient Status:  Inpatient   Age/Gender 35 year old female MRN [id number]   Location [facility] LABOR & DELIVERY Attending Yaritza Moon MD   Hosp Day # 1 PCP No primary care provider on file.       Anesthesia Post-op Note     SECTION    Procedure Summary       Date: 25 Room / Location:  L+D OR 03 /  L+D OR    Anesthesia Start: 1327 Anesthesia Stop: 25    Procedure:  SECTION Diagnosis:     Surgeons: Merary Dang DO Anesthesiologist: Kwasi Michelle DO    Anesthesia Type: epidural ASA Status: 2 - Emergent            Anesthesia Type: epidural    Vitals Value Taken Time   /68 25 02:21   Temp 99 °F (37.2 °C) 25 02:21   Pulse 75 25 02:21   Resp 24 25 02:21   SpO2 100 % 25 02:21           Patient Location: Labor and Delivery    Anesthesia Type: epidural    Airway Patency: patent    Postop Pain Control: adequate    Mental Status: preanesthetic baseline    Nausea/Vomiting: none    Cardiopulmonary/Hydration status: stable euvolemic    Complications: no apparent anesthesia related complications    Postop vital signs: stable    Dental Exam: Unchanged from Preop    Patient to be discharged from PACU when criteria met.

## 2025-07-24 NOTE — OPERATIVE REPORT
[facility]   part of [facility]    Post-Operative Note    Sara Priest Patient Status:  Inpatient    1990 MRN [id number]   Location [facility] LABOR & DELIVERY Attending Yaritza Moon MD   Hosp Day # 1 PCP No primary care provider on file.       Preoperative Diagnosis: IUP 39w2d, FTP, AMA, polyhydramnios  Postoperative Diagnosis: same  Procedure: primary  section, low transverse incision    Primary surgeon:       Assistant:Aakash Evans    Surgical Findings: normal anatomy  Anesthesia:Spinal   Complications: None; patient tolerated the procedure well.  Specimen: placenta  Drains: none  Condition: . doing well without problems  Estimated blood loss: 615 ml    Comment:     Baby:  male 7lb5oz       APGAR 1': 8  APGAR 5': 9    The patient was prepped and draped in the usual sterile fashion. A time out was performed and scd's were placed to decrease her risk for DVT and a dose of antibiotics was given preoperatively to decrease her risk for infection. After adequate level of anesthesia was ascertained, a Pfannenstiel incision was made and extended down to the level of the fascia. The fascia was incised and extended laterally with Fowler scissors.  The rectus muscles were  superiorly and inferiorly.  The peritoneal cavity was entered superiorly and extended inferiorly.  A bladder blade was placed, bladder flap created and bladder blade replaced. A low transverse incision was created and amniotomy was performed. The amniotic fluid was clear. The infant was bulb suctioned.  The remainder of the body was delivered without complication.  The infant was vigorously crying. The cord was clamped and cut.  Cord blood was obtained.  The infant was shown to the parents and placed in the radiant warmer.  There was manual delivery of an intact  placenta.  The Bjorn retractor was placed   Uterus, tubes and ovaries were normal in appearance.  The uterine cavity was swept clean  using a wet lap.   The first layer of the hysterotomy was closed using 0-vicryl.  A second imbricating layer was placed with same suture.  The incision was inspected for hemostasis. Bjorn retractor was removed and The bladder blade was replaced.  Re-inspection of the hysterotomy, peritomeum and rectus muscles noted them to be entirely hemostatic.  The fascia was closed in two halves using 1-0 vicryl.  The subcutaneous tissue was copiously irrigated and any bleeding cauterized.  The subcutaneous tissue was closed using plain gut. The skin was closed using 4-0 vicryl  The sponge and instrument counts were reported to me as being correct.  The patient tolerated the procedure and was stable to the recovery room.  The infant was stable to the  recovery room/    Merary Dang DO  7/24/2025  2:13 AM

## 2025-07-24 NOTE — PLAN OF CARE
Problem: BIRTH - VAGINAL/ SECTION  Goal: Fetal and maternal status remain reassuring during the birth process  Description: INTERVENTIONS:  - Monitor vital signs  - Monitor fetal heart rate  - Monitor uterine activity  - Monitor labor progression (vaginal delivery)  - DVT prophylaxis (C/S delivery)  - Surgical antibiotic prophylaxis (C/S delivery)  2025 by Meeta Servin RN  Outcome: Completed  2025 by Meeta Servin RN  Outcome: Progressing     Problem: PAIN - ADULT  Goal: Verbalizes/displays adequate comfort level or patient's stated pain goal  Description: INTERVENTIONS:  - Encourage pt to monitor pain and request assistance  - Assess pain using appropriate pain scale  - Administer analgesics based on type and severity of pain and evaluate response  - Implement non-pharmacological measures as appropriate and evaluate response  - Consider cultural and social influences on pain and pain management  - Manage/alleviate anxiety  - Utilize distraction and/or relaxation techniques  - Monitor for opioid side effects  - Notify MD/LIP if interventions unsuccessful or patient reports new pain  - Anticipate increased pain with activity and pre-medicate as appropriate  2025 by Meeta Servin RN  Outcome: Completed  2025 by Meeta Servin RN  Outcome: Progressing     Problem: ANXIETY  Goal: Will report anxiety at manageable levels  Description: INTERVENTIONS:  - Administer medication as ordered  - Teach and rehearse alternative coping skills  - Provide emotional support with 1:1 interaction with staff  2025 by Meeta Servin RN  Outcome: Completed  2025 by Meeta Servin RN  Outcome: Progressing     Problem: Patient/Family Goals  Goal: Patient/Family Long Term Goal  Description: Patient's Long Term Goal: Uncomplicated vaginal delivery    Interventions:  VS per protocol  I&O  Ice chips and sips as tolerated  EFM per protocol  Maintain IV as  ordered  Antibiotics as needed per protocol  Informed consent  - See additional Care Plan goals for specific interventions  7/24/2025 0247 by Meeta Servin, RN  Outcome: Completed  7/23/2025 1915 by Meeta Servin, RN  Outcome: Progressing  Goal: Patient/Family Short Term Goal  Description: Patient's Short Term Goal: Adequate pain control with delivery of infant  Interventions:  Pain assessment scores as ordered  Patient scores pain a \"3\" or less  Multidisciplinary care   Nonpharmacologic comfort measures    - See additional Care Plan goals for specific interventions  7/24/2025 0247 by Meeta Servin, RN  Outcome: Completed  7/23/2025 1915 by Meeta Servin, RN  Outcome: Progressing

## 2025-07-24 NOTE — H&P
[facility]  History & Physical    Sara Priest Patient Status:  Inpatient    1990 MRN [id number]   Location [facility] LABOR & DELIVERY Attending Yaritza Moon MD   Hosp Day # 0 PCP No primary care provider on file.     SUBJECTIVE:    Sara Priest is a 35 year old  female with EDC 25 at 39 and 1/7 weeks gestation who is being admitted for labor management.  Her current obstetrical history is significant for Rh Negative, advanced maternal age.  Patient reports leaking of clear  since this morning .   Fetal Movement: normal.     Obstetric History:   OB History    Para Term  AB Living   1        SAB IAB Ectopic Multiple Live Births             # Outcome Date GA Lbr Shmuel/2nd Weight Sex Type Anes PTL Lv   1 Current               Obstetric Comments   Current - AMA, overweight, excessive weight gain, insomnia, iron deficiency anemia, Rh negative, subclinical hypothyroidism, polyhydramnios noted at 33 wk, elevated A1c 5.8% at 34w1d suspicious for late onset GDM.      Past Medical History: Past Medical History[1]  Past Social History: Past Surgical History[2]  Family History: Family History[3]  Social History:   Social History     Tobacco Use    Smoking status: Former     Current packs/day: 0.00     Average packs/day: 0.5 packs/day for 5.0 years (2.5 ttl pk-yrs)     Types: Cigarettes     Quit date: 2017     Years since quittin.0    Smokeless tobacco: Never   Substance Use Topics    Alcohol use: Not Currently     Alcohol/week: 3.0 standard drinks of alcohol     Types: 3 Glasses of wine per week       Home Meds: Prescriptions Prior to Admission[4]  Allergies: Allergies[5]    OBJECTIVE:    Temp:  [97.6 °F (36.4 °C)-100.1 °F (37.8 °C)] 100.1 °F (37.8 °C)  Pulse:  [] 64  Resp:  [18-22] 18  BP: (109-154)/(64-89) 133/69  SpO2:  [95 %-100 %] 95 %    Lungs:   clear to auscultation bilaterally   Heart:   regular rate and rhythm, S1, S2 normal, no murmur, click, rub or  gallop   Abdomen: FHT present   Fetal Surveillance:  145 BPM   Fetal heart variability: moderate      Cervix: dilation closed on admission     Lab Review:  A, Rh -, Rubella-immune, Hepatitis B surface antigen non-reactive, GBS negative  Recent Labs   Lab 07/23/25  0615   RBC 3.74*   HGB 10.6*   HCT 31.2*   MCV 83.4   MCH 28.3   MCHC 34.0   RDW 13.0   NEPRELIM 7.34   WBC 10.2   .0            ASSESSMENT:    39 and 1/7 weeks gestation.  SROM  Obstetrical history significant for Rh Negative, advanced maternal age.    PLAN:    Risks, benefits, alternatives and possible complications have been discussed in detail with the patient.  Pre-admission, admission, and post admission procedures and expectations were discussed in detail.  All questions answered, all appropriate consents will be signed at the Hospital. Admission is planned for today.   Intervention: oral Prostin.    Merary Dang DO  7/23/2025  8:09 PM       [1]   Past Medical History:   Acne    Anemia    ASCUS with positive high risk HPV cervical    4/26/10 ASCUS, +HPV - [facility], [facility] per [facility]. Repeat pap in 1 year normal.    Bleeding internal hemorrhoids    saw GI    Hirsutism    mild on abdomen    History of tobacco use    1/2 ppd x 7 years from around [age] years old. Cold turkey.    Insomnia    During pregnancy - early morning awakening at 3 am noted at 12 wk gestation    Iron deficiency    Noted on initial prenatal labs      Overweight (BMI 25.0-29.9)    Screening for genetic disease carrier status    NEGATIVE FOR 14 OUT OF 14 DISEASES   [2] History reviewed. No pertinent surgical history.  [3]   Family History  Problem Relation Age of Onset    Prostate Cancer Father 50        caught early    Breast Cancer Maternal Grandmother 72    Cancer Maternal Grandmother     Diabetes Neg     Colon Cancer Neg     Ovarian Cancer Neg     Uterine Cancer Neg     Birth Defects Neg     Genetic Disease Neg    [4]   Facility-Administered Medications  Prior to Admission   Medication Dose Route Frequency Provider Last Rate Last Admin    [COMPLETED] Rho D immune globulin (RhoGAM) IM injection 300 mcg  300 mcg Intramuscular Once    300 mcg at 05/07/25 0907     Medications Prior to Admission   Medication Sig Dispense Refill Last Dose/Taking    aspirin 81 MG Oral Tab EC Take 1 tablet (81 mg total) by mouth in the morning.   Past Week    Prenatal MV-Min-Fe Fum-FA-DHA (PRENATAL 1 OR) Take by mouth.   Past Week   [5] No Known Allergies

## 2025-07-25 RX ORDER — GABAPENTIN 300 MG/1
300 CAPSULE ORAL EVERY 8 HOURS
Status: DISCONTINUED | OUTPATIENT
Start: 2025-07-25 | End: 2025-07-27

## 2025-07-25 NOTE — PROGRESS NOTES
Patient AOx4, in NICU room 205,holding her baby in her arms, and spouse with her in recliner, verbalized comfort, and mild pain, and instructed to let nurse know if she needs assistance, patient verbalized understanding and will be taking her VS when she comes back to room

## 2025-07-25 NOTE — PROGRESS NOTES
Checked patient , in NICU , holding her baby, will be back to room soon as verbalized and per patient, she is doing ok

## 2025-07-25 NOTE — PROGRESS NOTES
[facility] stephy Priest Patient Status:  Inpatient   Age/Gender 35 year old female MRN [id number]   Location [facility] Attending Yaritza Moon MD   Hosp Day # 2 PCP No primary care provider on file.      Anesthesia Pain Progress Note    Anesthesia Technique:   Epidural Anesthesia     Pain Management Technique:  In addition to available oral supplemental and IV medications  Patient received neuraxial preservative free morphine for post procedural pain control.    Post Procedure Pain Quality:    Adequate    Pain Management Side Effects:  Pruritis improving without treatment.     /71   Pulse 57   Temp 98.1 °F (36.7 °C) (Oral)   Resp 18   Wt 100.2 kg (221 lb)   LMP 10/22/2024 (Exact Date)   SpO2 97%   Breastfeeding Yes   BMI 33.60 kg/m²       Injection Site: Injection site is intact on inspection     Complications from Pain Management or Anesthesia:   None    All patient questions were answered.  Follow up pain management is separate from intraoperative anesthetic needs.  Pain care is transitioned to primary service, with management by oral medications.    Thank you for asking us to participate in the care of your patient.    Avel Larkin MD, 25, 10:07 AM      Avel Larkin MD, 25, 10:07 AM

## 2025-07-25 NOTE — PROGRESS NOTES
[facility]  Obstetrics and Gynecology    OB/GYN: Postpartum Progress Note     SUBJECTIVE:  Patient is a 35 year old  female who is s/p PCS 2/2 failure to progress. She is POD# 1.   Doing well. Pain is moderately controlled, pain medications helping. Denies fever, chills, N, V, chest pain and SOB. Bleeding has been stable. Voiding without difficulty.  Passing flatus. Tolerating general diet. Ambulating without difficulty.     OBJECTIVE:  Vitals:    25 2107 25 0016 25 0325 25 0605   BP: 118/78 131/60 111/67 129/71   Pulse: 63 54 59 57   Resp: 18      Temp:       TempSrc:       SpO2:       Weight:           Physical Exam:  Lungs:   Non labored breathing    Cardiovascular:   Regular rate and rhythm, well perfused     General:    AAA. NAD.   Abdomen Soft, nontender, nondistended   Lochia:  appropriate   Uterine Fundus:   firm below umbilicus   Incision:  healing well, no significant drainage, no dehiscence, no significant erythema with steri-strips in place    DVT Evaluation:  No evidence of DVT seen on physical exam.  Negative Jane's sign.  No cords or calf tenderness.  No significant calf/ankle edema     Urinary output is adequate.   I/O last 3 completed shifts:  In: 2774.8 [I.V.:2514.8; IV PIGGYBACK:260]  Out: 3815 [Urine:3200; Blood:615]      Labs:  Recent Labs   Lab 25  0710   RBC 3.55*   HGB 9.9*   HCT 28.8*   MCV 81.1   MCH 27.9   MCHC 34.4   RDW 12.9   NEPRELIM 16.54*   WBC 18.5*   .0       ASSESSMENT/PLAN:  Patient is a 35 year old  female who is s/p PCS 2/2 failure to progress POD# 1.     #Rh negative  - s/p rhogam    #gHTN  - on procardia 30 mg every day  - PIH labs overall unremarkable, AST slightly elevated 42  - pt asymptomatic  - 3-5 day PP BP check     #Circumcision for infant requested by patient.   Discussed that indication for circumcision is largely cultural or aesthetic.    Evidenced-based health benefits include decreased risk of UTI in  the first year and decreased risk of HIV transmission later in life.  Risks include:  1) infection due to lack of hygiene in the post-procedure period.  I emphasized the importance of washing hands with each diaper change, changing diapers frequently, using barrier creme  2) bleeding.  May need surgicel after procedure  3) patient dissatisfaction with cosmesis later in life  Risks, benefits, alternatives discussed. All questions answered.     - Baby currently in NICU but transferring over later today    Doing well   Continue routine postpartum care  Vitals per routine   Hgb on admit --> postpartum: 10.6 --> 9.9   - IV iron x 1  Encourage ambulation and IS use   Plan for discharge to home POD3  Follow up in 6 weeks        Hannah Anderson MD   [facility] - OBGYN      Note to patient and family   The 21st Century Cures Act makes medical notes available to patients in the interest of transparency.  However, please be advised that this is a medical document.  It is intended as [id number] communication.  It is written and medical language may contain abbreviations or verbiage that are technical and unfamiliar.  It may appear blunt or direct.  Medical documents are intended to carry relevant information, facts as evident, and the clinical opinion of the practitioner.

## 2025-07-25 NOTE — PLAN OF CARE
Problem: POSTPARTUM  Goal: Long Term Goal:Experiences normal postpartum course  Description: INTERVENTIONS:  - Assess and monitor vital signs and lab values.  - Assess fundus and lochia.  - Provide ice/sitz baths for perineum discomfort.  - Monitor healing of incision/episiotomy/laceration, and assess for signs and symptoms of infection and hematoma.  - Assess bladder function and monitor for bladder distention.  - Provide/instruct/assist with pericare as needed.  - Provide VTE prophylaxis as needed.  - Monitor bowel function.  - Encourage ambulation and provide assistance as needed.  - Assess and monitor emotional status and provide social service/psych resources as needed.  - Utilize standard precautions and use personal protective equipment as indicated. Ensure aseptic care of all intravenous lines and invasive tubes/drains.  - Obtain immunization and exposure to communicable diseases history.  7/25/2025 1256 by Yasmin Bass, RN  Outcome: Progressing  7/25/2025 1256 by Yasmin Bass, RN  Outcome: Progressing  Goal: Optimize infant feeding at the breast  Description: INTERVENTIONS:  - Initiate breast feeding within first hour after birth.   - Monitor effectiveness of current breast feeding efforts.  - Assess support systems available to mother/family.  - Identify cultural beliefs/practices regarding lactation, letdown techniques, maternal food preferences.  - Assess mother's knowledge and previous experience with breast feeding.  - Provide information as needed about early infant feeding cues (e.g., rooting, lip smacking, sucking fingers/hand) versus late cue of crying.  - Discuss/demonstrate breast feeding aids (e.g., infant sling, nursing footstool/pillows, and breast pumps).  - Encourage mother/other family members to express feelings/concerns, and actively listen.  - Educate father/SO about benefits of breast feeding and how to manage common lactation challenges.  - Recommend avoidance of  specific medications or substances incompatible with breast feeding.  - Assess and monitor for signs of nipple pain/trauma.  - Instruct and provide assistance with proper latch.  - Review techniques for milk expression (breast pumping) and storage of breast milk. Provide pumping equipment/supplies, instructions and assistance, as needed.  - Encourage rooming-in and breast feeding on demand.  - Encourage skin-to-skin contact.  - Provide LC support as needed.  - Assess for and manage engorgement.  - Provide breast feeding education handouts and information on community breast feeding support.   2025 125 by Yasmin Bass RN  Outcome: Progressing  2025 by Yasmin Bass, RN  Outcome: Progressing  Goal: Establishment of adequate milk supply with medication/procedure interruptions  Description: INTERVENTIONS:  - Review techniques for milk expression (breast pumping).   - Provide pumping equipment/supplies, instructions, and assistance until it is safe to breastfeed infant.  2025 125 by Yasmin Bass RN  Outcome: Progressing  2025 by Yasmin Bass RN  Outcome: Progressing  Goal: Appropriate maternal -  bonding  Description: INTERVENTIONS:  - Assess caregiver- interactions.  - Assess caregiver's emotional status and coping mechanisms.  - Encourage caregiver to participate in  daily care.  - Assess support systems available to mother/family.  - Provide /case management support as needed.  2025 125 by Yasmin Bass RN  Outcome: Progressing  2025 by Yasmin Bass RN  Outcome: Progressing

## 2025-07-26 PROBLEM — O41.1231: Status: ACTIVE | Noted: 2025-07-26

## 2025-07-26 PROBLEM — O13.3 GESTATIONAL HYPERTENSION, THIRD TRIMESTER (HCC): Status: ACTIVE | Noted: 2025-07-26

## 2025-07-26 PROBLEM — O41.1231: Status: ACTIVE | Noted: 2025-07-23

## 2025-07-26 PROBLEM — Z98.891 STATUS POST PRIMARY LOW TRANSVERSE CESAREAN SECTION: Status: ACTIVE | Noted: 2025-07-26

## 2025-07-26 PROBLEM — Z98.891 STATUS POST PRIMARY LOW TRANSVERSE CESAREAN SECTION: Status: ACTIVE | Noted: 2025-07-24

## 2025-07-26 RX ORDER — FUROSEMIDE 20 MG/1
20 TABLET ORAL DAILY PRN
Qty: 5 TABLET | Refills: 0 | Status: SHIPPED | OUTPATIENT
Start: 2025-07-26

## 2025-07-26 RX ORDER — FUROSEMIDE 20 MG/1
20 TABLET ORAL DAILY PRN
Status: DISCONTINUED | OUTPATIENT
Start: 2025-07-26 | End: 2025-07-27

## 2025-07-26 RX ORDER — GABAPENTIN 300 MG/1
300 CAPSULE ORAL EVERY 8 HOURS
Qty: 30 CAPSULE | Refills: 0 | Status: SHIPPED | OUTPATIENT
Start: 2025-07-26

## 2025-07-26 RX ORDER — POTASSIUM CHLORIDE 1500 MG/1
20 TABLET, EXTENDED RELEASE ORAL DAILY PRN
Qty: 5 TABLET | Refills: 0 | Status: SHIPPED | OUTPATIENT
Start: 2025-07-26

## 2025-07-26 RX ORDER — POTASSIUM CHLORIDE 1500 MG/1
20 TABLET, EXTENDED RELEASE ORAL DAILY PRN
Status: DISCONTINUED | OUTPATIENT
Start: 2025-07-26 | End: 2025-07-27

## 2025-07-26 RX ORDER — NIFEDIPINE 30 MG
30 TABLET, EXTENDED RELEASE ORAL DAILY
Qty: 30 TABLET | Refills: 1 | Status: SHIPPED | OUTPATIENT
Start: 2025-07-27

## 2025-07-26 RX ORDER — IBUPROFEN 600 MG/1
600 TABLET, FILM COATED ORAL EVERY 6 HOURS
Qty: 30 TABLET | Refills: 0 | Status: SHIPPED | OUTPATIENT
Start: 2025-07-26

## 2025-07-26 NOTE — DISCHARGE INSTRUCTIONS
Hypertension related to pregnancy/postpartum    -Watch for symptoms of pre-eclampsia (severe or persistent headache not relieved with a dose of tylenol, visual disturbances, persistent or severe upper abdominal pain, shortness of breath, chest pain)  -Please obtain a blood pressure cuff for home from the list of US Blood Pressure Validated Device Listing- see www.validatebp.org  -Check blood pressure (and heart rate if you can) 2-3 times per day & more frequently if feeling poorly. Keep log & bring to visits.      If BP is 140/90 or higher (either number) you will likely be prescribed oral antihypertensive medication. Check blood pressure before a dose of medication. Can hold the medicine if you feel your blood pressure is getting too low (less than 110/70) or you are lightheaded.      If BP is 160/110 (either number) or higher, or you develop symptoms of pre-eclampsia, please immediately go to the emergency department at [facility] & let them know you may have pre-eclampsia. You will likely require IV antihypertensives and IV magnesium sulfate to prevent stroke and seizures.      Discharge instructions after  section:    Nothing in vagina for 6 weeks.     AVOID CONSTIPATION:   -Take Miralax one capful in water or juice each morning.  You can also take each evening iif needed.  -Take Fiber supplement along with Miralax as well.  -May also take milk of magnesia or Dulcolax over the counter if needing to have a BM more urgently than Miralax is providing    -Do NOT strain for bowel movements    No strenuous activity/exercise  No tub baths.  No lifting over 10 pounds (1 gallon of mild is 8 pounds) for 6 weeks   May shower immediately    If you have a BOBY negative wound pressure dressing: the battery pack is designed to stop working after 7-8 days or so. When the batter stops working, please gently peel off the dressing & discard.     Keep wound(s) clean and dry. Wash daily with warm water & soap. Do not  scrub. Gently pat dry. May cover with clean gauze or pad if needed.   Can wash with Hibiclens over the abdomen/torso to help decrease bacterial colonization if you prefer. It is alcohol-based so it can be somewhat drying.     Silicone gel or sheeting should be applied only AFTER 4 WEEKS. For silicone gel sheeting, a minimum 12-hour daily wear time is suggested and, if possible, continuous 24-hour coverage with washing twice daily is recommended. Use of silicone gel in cream or ointment form may be preferable to silicone gel sheeting for high-mobility or large areas, use on the face, or in hot humid climates.     You may ride in a car immediately.  You may drive after 1-2 weeks. Must not drive if taking a narcotic (e.g. Norco, Oxycodone) or gabapentin as these are sedating.   Nothing in the vagina or 6 weeks     Please call office if:  -fever 100.4 or higher    Please proceed to the Emergency Department at [facility] for any of the following:   -vaginal bleeding soaking greater than 1 pad per hour  -severe pelvic pain  -shortness of breath  -chest pain  -leg pain or swelling  -persistent or severe headache  -vision changes  -persistent or severe upper abdominal pain  -feeling depressed or extremely anxious  -thoughts of self harm or harming infant(s).

## 2025-07-26 NOTE — PROGRESS NOTES
[facility]  Obstetrics and Gynecology    OB/GYN: Postpartum Progress Note     SUBJECTIVE:  Patient is a 35 year old  female who is s/p PCS 2/2 failure to progress. She is POD# 2.     Doing well. Pain is moderately controlled, pain medications helping. Denies fever, chills, N, V, chest pain and SOB. Bleeding has been stable. Voiding without difficulty.  Passing flatus. Tolerating general diet. Ambulating without difficulty.     OBJECTIVE:  Vitals:    25 1240 25 1526 25 2030 25 0740   BP: 140/81 132/81 126/83 109/67   Pulse: 100 58 60 58   Resp: 16  18 16   Temp: 98 °F (36.7 °C)  98.2 °F (36.8 °C) 98 °F (36.7 °C)   TempSrc: Oral  Oral Oral   SpO2:       Weight:           Physical Exam:  Lungs:   Non labored breathing    Cardiovascular:   Regular rate and rhythm, well perfused     General:    AAA. NAD.   Abdomen Soft, nontender, nondistended   Lochia:  appropriate   Uterine Fundus:   firm below umbilicus   Incision:  healing well, no significant drainage, no dehiscence, no significant erythema with steri-strips in place    DVT Evaluation:  NT, 2+ BLE edema to the shins     Urinary output is adequate.   I/O last 3 completed shifts:  In: -   Out: 200 [Urine:200]      Labs:  Recent Labs   Lab 25  0710   RBC 3.55*   HGB 9.9*   HCT 28.8*   MCV 81.1   MCH 27.9   MCHC 34.4   RDW 12.9   NEPRELIM 16.54*   WBC 18.5*   .0       ASSESSMENT/PLAN:  Patient is a 35 year old  female who is s/p PLTCS due to arrest of descent - pushing for 3 hours with good effort. Fetal descend arrested at +1 station with pushing, 0 station between contractions - POD#2 complicated by chorio., GHTN, AMA     #Rh negative  - s/p rhogam    #GHTN   - on procardia 30 mg every day  - PIH labs overall unremarkable, AST slightly elevated 42  - pt asymptomatic  - 3-5 day PP BP check     #BLE edema  -lasix & Kdur daily PRN edema     Circumcision for infant requested by patient. Infant with hidden or  \"concealed\" penis. Recommend deferring circumcision to pediatric urology. Patient expressed understanding.     Doing well   Continue routine postpartum care  Vitals per routine   Hgb on admit --> postpartum: 10.6 --> 9.9   - IV iron x 1  Encourage ambulation and IS use   Disposition probable discharge home tomorrow pending blood pressures.        Yaritza Moon MD   [facility] - OBGYN      Note to patient and family   The 21st Century Cures Act makes medical notes available to patients in the interest of transparency.  However, please be advised that this is a medical document.  It is intended as ugay-rt-fuof communication.  It is written and medical language may contain abbreviations or verbiage that are technical and unfamiliar.  It may appear blunt or direct.  Medical documents are intended to carry relevant information, facts as evident, and the clinical opinion of the practitioner.

## 2025-07-26 NOTE — DISCHARGE SUMMARY
[facility]   part of [facility]    Discharge Summary    Sara Priest Patient Status:  Inpatient    1990 MRN [id number]   Location [facility] 2SW-J Attending Yaritza Moon MD   Hosp Day # 4 PCP No primary care provider on file.     Date of Admission: 2025    Date of Discharge: 2025     Admission Diagnoses:   IUP at 39w1d   Leaking amniotic fluid     Discharge Diagnosis:   Gestational hypertension without severe features  Chorioamnionitis  Arrest of descent  Status post primary low transverse  section at 39w2d     Primary OB Clinician: Red Dang Keeney, [facility]:     35 year old  female with EDC 25 at 39 and 1/7 weeks gestation who is being admitted for labor management. She reported leaking clear fluid.     AMA, overweight, excessive weight gain, insomnia, iron deficiency anemia, Rh negative, subclinical hypothyroidism, polyhydramnios noted at 33 wk, elevated A1c 5.8% at 34w1d suspicious for late onset GDM.     Gestational hypertension without severe features. Labor complicated by chorioamnionitis & arrest of descent - pushing for 3 hours with good effort. Fetal descend arrested at +1 station with pushing, 0 station between contractions     25 Primary low transverse  section for arrest of descent.    Nifedipine 30 mg XR daily for blood pressure. Labs with minimally elevated AST 42. Patient asymptomatic. Lasix & Kdur for bilateral lower extremity edema. IV iron x 1 dose for postpartum anemia with Hb 9.9.      Surgical Procedures       Case IDs Date Procedure Surgeon Location Status    [id number] 25  SECTION Merary Dang DO [facility] L+D OR Comp          Discharge Plan:   Discharge Condition: Stable  Early Discharge:  NO    Discharge medications:     Discharge Medications        START taking these medications        Instructions Prescription details   furosemide 20 MG Tabs  Commonly known as: Lasix      Take  1 tablet (20 mg total) by mouth daily as needed (excessive swelling).   Quantity: 5 tablet  Refills: 0     gabapentin 300 MG Caps  Commonly known as: Neurontin      Take 1 capsule (300 mg total) by mouth every 8 (eight) hours.   Quantity: 30 capsule  Refills: 0     ibuprofen 600 MG Tabs  Commonly known as: Motrin  Notes to patient: Tylenol 1000mg   [id number]      Take 1 tablet (600 mg total) by mouth every 6 (six) hours.   Quantity: 30 tablet  Refills: 0     NIFEdipine ER 30 MG Tb24  Commonly known as: Adalat CC      Take 1 tablet (30 mg total) by mouth daily.   Quantity: 30 tablet  Refills: 1     potassium chloride 20 MEQ Tbcr  Commonly known as: Klor-Con M20      Take 1 tablet (20 mEq total) by mouth daily as needed (if needing furosemide (Lasix) for swelling).   Quantity: 5 tablet  Refills: 0            CONTINUE taking these medications        Instructions Prescription details   PRENATAL 1 OR      Take by mouth.   Refills: 0            STOP taking these medications      aspirin 81 MG Tbec                  Where to Get Your Medications        These medications were sent to [id number] - [place], [place] - [address] AT [facility], [phone], [phone]  [address] [facility] [address]      Phone: [phone]   furosemide 20 MG Tabs  gabapentin 300 MG Caps  ibuprofen 600 MG Tabs  NIFEdipine ER 30 MG Tb24  potassium chloride 20 MEQ Tbcr              Discharge Diet: As tolerated    Discharge Activity: Pelvic rest until cleared    Follow up:      Follow-up Information       [facility]. Call.    Specialty: Pediatrics  Why: As needed  Contact information:  [address] [place] [address] [phone]  Additional information:  Your appointment is scheduled at [facility] - [address] 2nd floor [facility] parking is available for the Breastfeeding Center in parking lot B off of  [address]. Parking lot B is the parking lot closest to the Breastfeeding Center building.      What to Bring to your Appointment:      Referrals-   If your insurance requires a referral, you will need a referral from your OB physician for yourself and a referral from your baby’s physician for baby.      Fax referrals to the Breastfeeding Center at [phone]   Baby and All Supplies-   Adjust your feeding schedule on the day of your appointment so baby is hungry at the time of your appointment. This usually means to NOT feed for at least 2 - 3 hours prior to your appointment      Please bring ALL equipment you are using (such as a breast pump, pump parts, nipple shield, etc).      If you are supplementing your baby, bring enough expressed breastmilk or formula for a full feeding, and the method you are using for feeding your baby.      Masks are optional for all patients and visitors, unless otherwise indicated.             [facility] - OB/GYN. Schedule an appointment as soon as possible for a visit in 3 day(s).    Specialty: Obstetrics/Gynecology  Why: BP check 3-5 days after hospital discharge  Contact information:  [address]  [facility] [address] [phone]  Additional information:  Masks are optional for all patients and visitors, unless otherwise indicated.   Note: A $50 fee will be charged for missed appointments or same-day cancellations. Please provide 24 hours' notice if you need to cancel or reschedule your appointment.             [facility] - OB/GYN. Schedule an appointment as soon as possible for a visit in 6 week(s).    Specialty: Obstetrics/Gynecology  Why: Postpartum visit  Contact information:  [address]  [facility] [address] [phone]  Additional information:  Masks are optional for all patients and visitors, unless otherwise indicated.   Note: A  $50 fee will be charged for missed appointments or same-day cancellations. Please provide 24 hours' notice if you need to cancel or reschedule your appointment.                               Other Discharge Instructions:         Hypertension related to pregnancy/postpartum    -Watch for symptoms of pre-eclampsia (severe or persistent headache not relieved with a dose of tylenol, visual disturbances, persistent or severe upper abdominal pain, shortness of breath, chest pain)  -Please obtain a blood pressure cuff for home from the list of US Blood Pressure Validated Device Listing- see www.validatebp.org  -Check blood pressure (and heart rate if you can) 2-3 times per day & more frequently if feeling poorly. Keep log & bring to visits.      If BP is 140/90 or higher (either number) you will likely be prescribed oral antihypertensive medication. Check blood pressure before a dose of medication. Can hold the medicine if you feel your blood pressure is getting too low (less than 110/70) or you are lightheaded.      If BP is 160/110 (either number) or higher, or you develop symptoms of pre-eclampsia, please immediately go to the emergency department at [facility] & let them know you may have pre-eclampsia. You will likely require IV antihypertensives and IV magnesium sulfate to prevent stroke and seizures.      Discharge instructions after  section:    Nothing in vagina for 6 weeks.     AVOID CONSTIPATION:   -Take Miralax one capful in water or juice each morning.  You can also take each evening iif needed.  -Take Fiber supplement along with Miralax as well.  -May also take milk of magnesia or Dulcolax over the counter if needing to have a BM more urgently than Miralax is providing    -Do NOT strain for bowel movements    No strenuous activity/exercise  No tub baths.  No lifting over 10 pounds (1 gallon of mild is 8 pounds) for 6 weeks   May shower immediately    If you have a BOBY negative wound pressure  dressing: the battery pack is designed to stop working after 7-8 days or so. When the batter stops working, please gently peel off the dressing & discard.     Keep wound(s) clean and dry. Wash daily with warm water & soap. Do not scrub. Gently pat dry. May cover with clean gauze or pad if needed.   Can wash with Hibiclens over the abdomen/torso to help decrease bacterial colonization if you prefer. It is alcohol-based so it can be somewhat drying.     Silicone gel or sheeting should be applied only AFTER 4 WEEKS. For silicone gel sheeting, a minimum 12-hour daily wear time is suggested and, if possible, continuous 24-hour coverage with washing twice daily is recommended. Use of silicone gel in cream or ointment form may be preferable to silicone gel sheeting for high-mobility or large areas, use on the face, or in hot humid climates.     You may ride in a car immediately.  You may drive after 1-2 weeks. Must not drive if taking a narcotic (e.g. Norco, Oxycodone) or gabapentin as these are sedating.   Nothing in the vagina or 6 weeks     Please call office if:  -fever 100.4 or higher    Please proceed to the Emergency Department at [facility] for any of the following:   -vaginal bleeding soaking greater than 1 pad per hour  -severe pelvic pain  -shortness of breath  -chest pain  -leg pain or swelling  -persistent or severe headache  -vision changes  -persistent or severe upper abdominal pain  -feeling depressed or extremely anxious  -thoughts of self harm or harming infant(s).         Yaritza Moon MD

## 2025-07-27 VITALS
OXYGEN SATURATION: 97 % | DIASTOLIC BLOOD PRESSURE: 82 MMHG | TEMPERATURE: 98 F | HEART RATE: 63 BPM | RESPIRATION RATE: 16 BRPM | BODY MASS INDEX: 34 KG/M2 | SYSTOLIC BLOOD PRESSURE: 131 MMHG | WEIGHT: 221 LBS

## 2025-07-27 NOTE — PLAN OF CARE
Problem: POSTPARTUM  Goal: Long Term Goal:Experiences normal postpartum course  Description: INTERVENTIONS:  - Assess and monitor vital signs and lab values.  - Assess fundus and lochia.  - Provide ice/sitz baths for perineum discomfort.  - Monitor healing of incision/episiotomy/laceration, and assess for signs and symptoms of infection and hematoma.  - Assess bladder function and monitor for bladder distention.  - Provide/instruct/assist with pericare as needed.  - Provide VTE prophylaxis as needed.  - Monitor bowel function.  - Encourage ambulation and provide assistance as needed.  - Assess and monitor emotional status and provide social service/psych resources as needed.  - Utilize standard precautions and use personal protective equipment as indicated. Ensure aseptic care of all intravenous lines and invasive tubes/drains.  - Obtain immunization and exposure to communicable diseases history.  7/26/2025 2145 by oJann Mar RN  Outcome: Progressing  7/26/2025 2145 by Joann Mar RN  Outcome: Progressing  Goal: Optimize infant feeding at the breast  Description: INTERVENTIONS:  - Initiate breast feeding within first hour after birth.   - Monitor effectiveness of current breast feeding efforts.  - Assess support systems available to mother/family.  - Identify cultural beliefs/practices regarding lactation, letdown techniques, maternal food preferences.  - Assess mother's knowledge and previous experience with breast feeding.  - Provide information as needed about early infant feeding cues (e.g., rooting, lip smacking, sucking fingers/hand) versus late cue of crying.  - Discuss/demonstrate breast feeding aids (e.g., infant sling, nursing footstool/pillows, and breast pumps).  - Encourage mother/other family members to express feelings/concerns, and actively listen.  - Educate father/SO about benefits of breast feeding and how to manage common lactation challenges.  - Recommend avoidance of specific  medications or substances incompatible with breast feeding.  - Assess and monitor for signs of nipple pain/trauma.  - Instruct and provide assistance with proper latch.  - Review techniques for milk expression (breast pumping) and storage of breast milk. Provide pumping equipment/supplies, instructions and assistance, as needed.  - Encourage rooming-in and breast feeding on demand.  - Encourage skin-to-skin contact.  - Provide LC support as needed.  - Assess for and manage engorgement.  - Provide breast feeding education handouts and information on community breast feeding support.   2025 by Joann Mar RN  Outcome: Progressing  2025 by Joann Mar RN  Outcome: Progressing  Goal: Establishment of adequate milk supply with medication/procedure interruptions  Description: INTERVENTIONS:  - Review techniques for milk expression (breast pumping).   - Provide pumping equipment/supplies, instructions, and assistance until it is safe to breastfeed infant.  2025 by Joann Mar RN  Outcome: Progressing  2025 by Joann Mar RN  Outcome: Progressing  Goal: Appropriate maternal -  bonding  Description: INTERVENTIONS:  - Assess caregiver- interactions.  - Assess caregiver's emotional status and coping mechanisms.  - Encourage caregiver to participate in  daily care.  - Assess support systems available to mother/family.  - Provide /case management support as needed.  2025 by Joann Mar RN  Outcome: Progressing  2025 by Joann Mar RN  Outcome: Progressing     Problem: Diabetes/Glucose Control  Goal: Glucose maintained within prescribed range  Description: INTERVENTIONS:  - Monitor Blood Glucose as ordered  - Assess for signs and symptoms of hyperglycemia and hypoglycemia  - Administer ordered medications to maintain glucose within target range  - Assess barriers to adequate nutritional intake and initiate  nutrition consult as needed  - Instruct patient on self management of diabetes  Outcome: Progressing

## 2025-07-27 NOTE — PROGRESS NOTES
Patient AOx4, up and about, VSS, seen by OB ok to go home, postpartum care, meds and  care DC instructions reviewed and completed, and will make the follow up appointment in 3-5 days for BP check,  as instructed, significant other at bedside , very supportive, and answered questions , LC also worked with patient and instructions completed, patient verbalized and demonstrated understanding of instructions, and signed infant custody release electronically .  Patient up in wheelchair, going home with significant other , and baby in car seat, with their belongings

## 2025-07-28 NOTE — PAYOR COMM NOTE
--------------  DISCHARGE REVIEW    Payor: [facility]  Subscriber #:  [id number]  Authorization Number: [id number]    Admit date: 25  Admit time:   6:50 AM  Discharge Date: 2025 11:05 AM     Admitting Physician: Yaritza Moon MD  Attending Physician:  No att. providers found  Primary Care Physician: No primary care provider on file.          Discharge Summary Notes        Discharge Summary signed by Yaritza Moon MD at 2025  8:02 PM       Author: Yaritza Moon MD Specialty: OBSTETRICS & GYNECOLOGY Author Type: Physician    Filed: 2025  8:02 PM Date of Service: 2025  2:54 PM Status: Addendum    : Yaritza Moon MD (Physician)    Related Notes: Original Note by Yaritza Moon MD (Physician) filed at 2025  8:00 PM           [facility]   part of [facility]    Discharge Summary    Sara Priest Patient Status:  Inpatient    1990 MRN [id number]   Location [facility] 2SW-J Attending Yaritza Moon MD   Hosp Day # 4 PCP No primary care provider on file.     Date of Admission: 2025    Date of Discharge: 2025     Admission Diagnoses:   IUP at 39w1d   Leaking amniotic fluid     Discharge Diagnosis:   Gestational hypertension without severe features  Chorioamnionitis  Arrest of descent  Status post primary low transverse  section at 39w2d     Primary OB Clinician: Red Dang Keeney, Hrvojevic     [facility]:     35 year old  female with EDC 25 at 39 and 1/7 weeks gestation who is being admitted for labor management. She reported leaking clear fluid.     AMA, overweight, excessive weight gain, insomnia, iron deficiency anemia, Rh negative, subclinical hypothyroidism, polyhydramnios noted at 33 wk, elevated A1c 5.8% at 34w1d suspicious for late onset GDM.     Gestational hypertension without severe features. Labor complicated by chorioamnionitis & arrest of descent - pushing for 3 hours with good effort.  Fetal descend arrested at +1 station with pushing, 0 station between contractions     25 Primary low transverse  section for arrest of descent.    Nifedipine 30 mg XR daily for blood pressure. Labs with minimally elevated AST 42. Patient asymptomatic. Lasix & Kdur for bilateral lower extremity edema. IV iron x 1 dose for postpartum anemia with Hb 9.9.      Surgical Procedures       Case IDs Date Procedure Surgeon Location Status    [id number] 25  SECTION Merary Dang,  EH L+D OR Comp          Discharge Plan:   Discharge Condition: Stable  Early Discharge:  NO    Discharge medications:     Discharge Medications        START taking these medications        Instructions Prescription details   furosemide 20 MG Tabs  Commonly known as: Lasix      Take 1 tablet (20 mg total) by mouth daily as needed (excessive swelling).   Quantity: 5 tablet  Refills: 0     gabapentin 300 MG Caps  Commonly known as: Neurontin      Take 1 capsule (300 mg total) by mouth every 8 (eight) hours.   Quantity: 30 capsule  Refills: 0     ibuprofen 600 MG Tabs  Commonly known as: Motrin  Notes to patient: Tylenol 1000mg   [id number]-3am      Take 1 tablet (600 mg total) by mouth every 6 (six) hours.   Quantity: 30 tablet  Refills: 0     NIFEdipine ER 30 MG Tb24  Commonly known as: Adalat CC      Take 1 tablet (30 mg total) by mouth daily.   Quantity: 30 tablet  Refills: 1     potassium chloride 20 MEQ Tbcr  Commonly known as: Klor-Con M20      Take 1 tablet (20 mEq total) by mouth daily as needed (if needing furosemide (Lasix) for swelling).   Quantity: 5 tablet  Refills: 0            CONTINUE taking these medications        Instructions Prescription details   PRENATAL 1 OR      Take by mouth.   Refills: 0            STOP taking these medications      aspirin 81 MG Tbec                  Where to Get Your Medications        These medications were sent to [id number] - [place], [place] - [address]  AT [facility] [address], [phone], [phone]  [address] [address]      Phone: [phone]   furosemide 20 MG Tabs  gabapentin 300 MG Caps  ibuprofen 600 MG Tabs  NIFEdipine ER 30 MG Tb24  potassium chloride 20 MEQ Tbcr              Discharge Diet: As tolerated    Discharge Activity: Pelvic rest until cleared    Follow up:      Follow-up Information       [facility]. Call.    Specialty: Pediatrics  Why: As needed  Contact information:  [address] [place] [address] [phone]  Additional information:  Your appointment is scheduled at [facility] - [address] 2nd floor [place], [place]   Reserved parking is available for the Breastfeeding Center in parking lot B off of [address]. Parking lot B is the parking lot closest to the Breastfeeding Center building.      What to Bring to your Appointment:      Referrals-   If your insurance requires a referral, you will need a referral from your OB physician for yourself and a referral from your baby’s physician for baby.      Fax referrals to the Breastfeeding Center at [phone]   Baby and All Supplies-   Adjust your feeding schedule on the day of your appointment so baby is hungry at the time of your appointment. This usually means to NOT feed for at least 2 - 3 hours prior to your appointment      Please bring ALL equipment you are using (such as a breast pump, pump parts, nipple shield, etc).      If you are supplementing your baby, bring enough expressed breastmilk or formula for a full feeding, and the method you are using for feeding your baby.      Masks are optional for all patients and visitors, unless otherwise indicated.             [facility], [facility] - OB/GYN. Schedule an appointment as soon as possible for a visit in 3 day(s).    Specialty: Obstetrics/Gynecology  Why: BP check 3-5 days after hospital  discharge  Contact information:  [address]  [facility] [address] [phone]  Additional information:  Masks are optional for all patients and visitors, unless otherwise indicated.   Note: A $50 fee will be charged for missed appointments or same-day cancellations. Please provide 24 hours' notice if you need to cancel or reschedule your appointment.             [facility], [facility] - OB/GYN. Schedule an appointment as soon as possible for a visit in 6 week(s).    Specialty: Obstetrics/Gynecology  Why: Postpartum visit  Contact information:  [address]  [facility] [address] [phone]  Additional information:  Masks are optional for all patients and visitors, unless otherwise indicated.   Note: A $50 fee will be charged for missed appointments or same-day cancellations. Please provide 24 hours' notice if you need to cancel or reschedule your appointment.                               Other Discharge Instructions:         Hypertension related to pregnancy/postpartum    -Watch for symptoms of pre-eclampsia (severe or persistent headache not relieved with a dose of tylenol, visual disturbances, persistent or severe upper abdominal pain, shortness of breath, chest pain)  -Please obtain a blood pressure cuff for home from the list of [place] Blood Pressure Validated Device Listing- see www.validatebp.org  -Check blood pressure (and heart rate if you can) 2-3 times per day & more frequently if feeling poorly. Keep log & bring to visits.      If BP is 140/90 or higher (either number) you will likely be prescribed oral antihypertensive medication. Check blood pressure before a dose of medication. Can hold the medicine if you feel your blood pressure is getting too low (less than 110/70) or you are lightheaded.      If BP is 160/110 (either number) or higher, or you develop symptoms of pre-eclampsia, please immediately go to the emergency  department at [facility] & let them know you may have pre-eclampsia. You will likely require IV antihypertensives and IV magnesium sulfate to prevent stroke and seizures.      Discharge instructions after  section:    Nothing in vagina for 6 weeks.     AVOID CONSTIPATION:   -Take Miralax one capful in water or juice each morning.  You can also take each evening iif needed.  -Take Fiber supplement along with Miralax as well.  -May also take milk of magnesia or Dulcolax over the counter if needing to have a BM more urgently than Miralax is providing    -Do NOT strain for bowel movements    No strenuous activity/exercise  No tub baths.  No lifting over 10 pounds (1 gallon of mild is 8 pounds) for 6 weeks   May shower immediately    If you have a BOBY negative wound pressure dressing: the battery pack is designed to stop working after 7-8 days or so. When the batter stops working, please gently peel off the dressing & discard.     Keep wound(s) clean and dry. Wash daily with warm water & soap. Do not scrub. Gently pat dry. May cover with clean gauze or pad if needed.   Can wash with Hibiclens over the abdomen/torso to help decrease bacterial colonization if you prefer. It is alcohol-based so it can be somewhat drying.     Silicone gel or sheeting should be applied only AFTER 4 WEEKS. For silicone gel sheeting, a minimum 12-hour daily wear time is suggested and, if possible, continuous 24-hour coverage with washing twice daily is recommended. Use of silicone gel in cream or ointment form may be preferable to silicone gel sheeting for high-mobility or large areas, use on the face, or in hot humid climates.     You may ride in a car immediately.  You may drive after 1-2 weeks. Must not drive if taking a narcotic (e.g. Norco, Oxycodone) or gabapentin as these are sedating.   Nothing in the vagina or 6 weeks     Please call office if:  -fever 100.4 or higher    Please proceed to the Emergency Department at  [facility] for any of the following:   -vaginal bleeding soaking greater than 1 pad per hour  -severe pelvic pain  -shortness of breath  -chest pain  -leg pain or swelling  -persistent or severe headache  -vision changes  -persistent or severe upper abdominal pain  -feeling depressed or extremely anxious  -thoughts of self harm or harming infant(s).         Yaritza Moon MD      Electronically signed by Yaritza Moon MD on 7/27/2025  8:02 PM

## 2025-07-29 ENCOUNTER — PATIENT OUTREACH (OUTPATIENT)
Dept: CASE MANAGEMENT | Age: 35
End: 2025-07-29

## (undated) DEVICE — Device